# Patient Record
Sex: FEMALE | Race: WHITE | Employment: FULL TIME | URBAN - NONMETROPOLITAN AREA
[De-identification: names, ages, dates, MRNs, and addresses within clinical notes are randomized per-mention and may not be internally consistent; named-entity substitution may affect disease eponyms.]

---

## 2018-04-05 ENCOUNTER — OFFICE VISIT (OUTPATIENT)
Dept: URGENT CARE | Age: 23
End: 2018-04-05
Payer: COMMERCIAL

## 2018-04-05 VITALS
DIASTOLIC BLOOD PRESSURE: 75 MMHG | WEIGHT: 126 LBS | HEIGHT: 64 IN | BODY MASS INDEX: 21.51 KG/M2 | OXYGEN SATURATION: 98 % | SYSTOLIC BLOOD PRESSURE: 118 MMHG | RESPIRATION RATE: 20 BRPM | TEMPERATURE: 99 F | HEART RATE: 69 BPM

## 2018-04-05 DIAGNOSIS — R52 BODY ACHES: ICD-10-CM

## 2018-04-05 DIAGNOSIS — J06.9 VIRAL UPPER RESPIRATORY TRACT INFECTION WITH COUGH: Primary | ICD-10-CM

## 2018-04-05 DIAGNOSIS — J02.9 SORE THROAT: ICD-10-CM

## 2018-04-05 LAB
INFLUENZA A ANTIBODY: NORMAL
INFLUENZA B ANTIBODY: NORMAL
S PYO AG THROAT QL: NORMAL

## 2018-04-05 PROCEDURE — 87804 INFLUENZA ASSAY W/OPTIC: CPT | Performed by: NURSE PRACTITIONER

## 2018-04-05 PROCEDURE — 99202 OFFICE O/P NEW SF 15 MIN: CPT | Performed by: NURSE PRACTITIONER

## 2018-04-05 PROCEDURE — 87880 STREP A ASSAY W/OPTIC: CPT | Performed by: NURSE PRACTITIONER

## 2018-04-05 RX ORDER — ONDANSETRON 4 MG/1
4 TABLET, ORALLY DISINTEGRATING ORAL EVERY 8 HOURS PRN
Qty: 30 TABLET | Refills: 0 | Status: SHIPPED | OUTPATIENT
Start: 2018-04-05

## 2018-04-05 RX ORDER — BENZONATATE 100 MG/1
100 CAPSULE ORAL 3 TIMES DAILY PRN
Qty: 30 CAPSULE | Refills: 0 | Status: SHIPPED | OUTPATIENT
Start: 2018-04-05 | End: 2018-04-15

## 2018-04-05 ASSESSMENT — ENCOUNTER SYMPTOMS
CHEST TIGHTNESS: 0
COUGH: 1
RHINORRHEA: 0
VOMITING: 1
EYES NEGATIVE: 1
TROUBLE SWALLOWING: 1
SINUS PRESSURE: 0
SORE THROAT: 1
CONSTIPATION: 0
ABDOMINAL PAIN: 0
DIARRHEA: 0
WHEEZING: 0
NAUSEA: 1
ALLERGIC/IMMUNOLOGIC NEGATIVE: 1

## 2018-06-10 ENCOUNTER — HOSPITAL ENCOUNTER (EMERGENCY)
Age: 23
Discharge: HOME | End: 2018-06-10
Payer: COMMERCIAL

## 2018-06-10 VITALS
BODY MASS INDEX: 21.51 KG/M2 | OXYGEN SATURATION: 96 % | HEIGHT: 64 IN | SYSTOLIC BLOOD PRESSURE: 116 MMHG | WEIGHT: 126 LBS | TEMPERATURE: 98.4 F | HEART RATE: 68 BPM | RESPIRATION RATE: 16 BRPM | DIASTOLIC BLOOD PRESSURE: 71 MMHG

## 2018-06-10 DIAGNOSIS — T63.461A WASP STING, ACCIDENTAL OR UNINTENTIONAL, INITIAL ENCOUNTER: Primary | ICD-10-CM

## 2018-06-10 PROCEDURE — 99282 EMERGENCY DEPT VISIT SF MDM: CPT

## 2018-06-10 PROCEDURE — 99283 EMERGENCY DEPT VISIT LOW MDM: CPT | Performed by: NURSE PRACTITIONER

## 2018-06-10 RX ORDER — DIPHENHYDRAMINE HCL 25 MG
25 CAPSULE ORAL EVERY 6 HOURS PRN
Qty: 30 CAPSULE | Refills: 0 | Status: SHIPPED | OUTPATIENT
Start: 2018-06-10 | End: 2018-06-20

## 2018-06-10 RX ORDER — PREDNISONE 20 MG/1
20 TABLET ORAL 2 TIMES DAILY
Qty: 10 TABLET | Refills: 0 | Status: SHIPPED | OUTPATIENT
Start: 2018-06-10 | End: 2018-06-15

## 2018-06-10 ASSESSMENT — ENCOUNTER SYMPTOMS
SORE THROAT: 0
WHEEZING: 0
VOMITING: 0
NAUSEA: 0
THROAT SWELLING: 0

## 2018-09-02 ENCOUNTER — HOSPITAL ENCOUNTER (EMERGENCY)
Age: 23
Discharge: HOME | End: 2018-09-03
Payer: COMMERCIAL

## 2018-09-02 DIAGNOSIS — N83.201 RIGHT OVARIAN CYST: Primary | ICD-10-CM

## 2018-09-02 PROCEDURE — 99284 EMERGENCY DEPT VISIT MOD MDM: CPT

## 2018-09-02 ASSESSMENT — PAIN SCALES - GENERAL: PAINLEVEL_OUTOF10: 8

## 2018-09-02 ASSESSMENT — PAIN DESCRIPTION - LOCATION: LOCATION: ABDOMEN

## 2018-09-02 ASSESSMENT — PAIN DESCRIPTION - PAIN TYPE: TYPE: ACUTE PAIN

## 2018-09-03 ENCOUNTER — APPOINTMENT (OUTPATIENT)
Dept: CT IMAGING | Age: 23
End: 2018-09-03
Payer: COMMERCIAL

## 2018-09-03 VITALS
RESPIRATION RATE: 18 BRPM | DIASTOLIC BLOOD PRESSURE: 60 MMHG | OXYGEN SATURATION: 97 % | HEIGHT: 63 IN | SYSTOLIC BLOOD PRESSURE: 98 MMHG | HEART RATE: 63 BPM | BODY MASS INDEX: 22.68 KG/M2 | WEIGHT: 128 LBS | TEMPERATURE: 98.7 F

## 2018-09-03 LAB
ALBUMIN SERPL-MCNC: 4.9 G/DL (ref 3.5–5.2)
ALP BLD-CCNC: 71 U/L (ref 35–104)
ALT SERPL-CCNC: 13 U/L (ref 5–33)
ANION GAP SERPL CALCULATED.3IONS-SCNC: 13 MMOL/L (ref 7–19)
AST SERPL-CCNC: 15 U/L (ref 5–32)
BASOPHILS ABSOLUTE: 0.1 K/UL (ref 0–0.2)
BASOPHILS RELATIVE PERCENT: 0.9 % (ref 0–1)
BILIRUB SERPL-MCNC: 0.5 MG/DL (ref 0.2–1.2)
BILIRUBIN URINE: NEGATIVE
BLOOD, URINE: NEGATIVE
BUN BLDV-MCNC: 11 MG/DL (ref 6–20)
CALCIUM SERPL-MCNC: 10 MG/DL (ref 8.6–10)
CHLORIDE BLD-SCNC: 104 MMOL/L (ref 98–111)
CLARITY: CLEAR
CO2: 25 MMOL/L (ref 22–29)
COLOR: YELLOW
CREAT SERPL-MCNC: 0.7 MG/DL (ref 0.5–0.9)
EOSINOPHILS ABSOLUTE: 0.1 K/UL (ref 0–0.6)
EOSINOPHILS RELATIVE PERCENT: 1.2 % (ref 0–5)
GFR NON-AFRICAN AMERICAN: >60
GLUCOSE BLD-MCNC: 81 MG/DL (ref 74–109)
GLUCOSE URINE: NEGATIVE MG/DL
HCG(URINE) PREGNANCY TEST: NEGATIVE
HCT VFR BLD CALC: 45.8 % (ref 37–47)
HEMOGLOBIN: 14.7 G/DL (ref 12–16)
KETONES, URINE: NEGATIVE MG/DL
LEUKOCYTE ESTERASE, URINE: NEGATIVE
LIPASE: 31 U/L (ref 13–60)
LYMPHOCYTES ABSOLUTE: 3.8 K/UL (ref 1.1–4.5)
LYMPHOCYTES RELATIVE PERCENT: 34.1 % (ref 20–40)
MCH RBC QN AUTO: 28.7 PG (ref 27–31)
MCHC RBC AUTO-ENTMCNC: 32.1 G/DL (ref 33–37)
MCV RBC AUTO: 89.5 FL (ref 81–99)
MONOCYTES ABSOLUTE: 0.8 K/UL (ref 0–0.9)
MONOCYTES RELATIVE PERCENT: 7.1 % (ref 0–10)
NEUTROPHILS ABSOLUTE: 6.4 K/UL (ref 1.5–7.5)
NEUTROPHILS RELATIVE PERCENT: 56.3 % (ref 50–65)
NITRITE, URINE: NEGATIVE
PDW BLD-RTO: 13.4 % (ref 11.5–14.5)
PH UA: 5.5
PLATELET # BLD: 349 K/UL (ref 130–400)
PMV BLD AUTO: 9.7 FL (ref 9.4–12.3)
POTASSIUM SERPL-SCNC: 3.8 MMOL/L (ref 3.5–5)
PROTEIN UA: NEGATIVE MG/DL
RBC # BLD: 5.12 M/UL (ref 4.2–5.4)
SODIUM BLD-SCNC: 142 MMOL/L (ref 136–145)
SPECIFIC GRAVITY UA: 1.03
TOTAL PROTEIN: 8.2 G/DL (ref 6.6–8.7)
URINE REFLEX TO CULTURE: NORMAL
UROBILINOGEN, URINE: 1 E.U./DL
WBC # BLD: 11.3 K/UL (ref 4.8–10.8)

## 2018-09-03 PROCEDURE — 81003 URINALYSIS AUTO W/O SCOPE: CPT

## 2018-09-03 PROCEDURE — 74177 CT ABD & PELVIS W/CONTRAST: CPT

## 2018-09-03 PROCEDURE — 6360000002 HC RX W HCPCS: Performed by: NURSE PRACTITIONER

## 2018-09-03 PROCEDURE — 99284 EMERGENCY DEPT VISIT MOD MDM: CPT | Performed by: NURSE PRACTITIONER

## 2018-09-03 PROCEDURE — 2580000003 HC RX 258: Performed by: NURSE PRACTITIONER

## 2018-09-03 PROCEDURE — 6360000004 HC RX CONTRAST MEDICATION: Performed by: NURSE PRACTITIONER

## 2018-09-03 PROCEDURE — 6370000000 HC RX 637 (ALT 250 FOR IP): Performed by: NURSE PRACTITIONER

## 2018-09-03 PROCEDURE — 83690 ASSAY OF LIPASE: CPT

## 2018-09-03 PROCEDURE — 96374 THER/PROPH/DIAG INJ IV PUSH: CPT

## 2018-09-03 PROCEDURE — 85025 COMPLETE CBC W/AUTO DIFF WBC: CPT

## 2018-09-03 PROCEDURE — 80053 COMPREHEN METABOLIC PANEL: CPT

## 2018-09-03 PROCEDURE — 81025 URINE PREGNANCY TEST: CPT

## 2018-09-03 PROCEDURE — 96375 TX/PRO/DX INJ NEW DRUG ADDON: CPT

## 2018-09-03 PROCEDURE — 36415 COLL VENOUS BLD VENIPUNCTURE: CPT

## 2018-09-03 RX ORDER — OXYCODONE HYDROCHLORIDE AND ACETAMINOPHEN 5; 325 MG/1; MG/1
2 TABLET ORAL ONCE
Status: COMPLETED | OUTPATIENT
Start: 2018-09-03 | End: 2018-09-03

## 2018-09-03 RX ORDER — KETOROLAC TROMETHAMINE 30 MG/ML
30 INJECTION, SOLUTION INTRAMUSCULAR; INTRAVENOUS ONCE
Status: COMPLETED | OUTPATIENT
Start: 2018-09-03 | End: 2018-09-03

## 2018-09-03 RX ORDER — NAPROXEN 500 MG/1
500 TABLET ORAL 2 TIMES DAILY
Qty: 20 TABLET | Refills: 0 | Status: SHIPPED | OUTPATIENT
Start: 2018-09-03

## 2018-09-03 RX ORDER — 0.9 % SODIUM CHLORIDE 0.9 %
1000 INTRAVENOUS SOLUTION INTRAVENOUS ONCE
Status: COMPLETED | OUTPATIENT
Start: 2018-09-03 | End: 2018-09-03

## 2018-09-03 RX ORDER — ONDANSETRON 2 MG/ML
4 INJECTION INTRAMUSCULAR; INTRAVENOUS ONCE
Status: COMPLETED | OUTPATIENT
Start: 2018-09-03 | End: 2018-09-03

## 2018-09-03 RX ADMIN — IOPAMIDOL 75 ML: 755 INJECTION, SOLUTION INTRAVENOUS at 02:14

## 2018-09-03 RX ADMIN — OXYCODONE HYDROCHLORIDE AND ACETAMINOPHEN 2 TABLET: 5; 325 TABLET ORAL at 02:57

## 2018-09-03 RX ADMIN — KETOROLAC TROMETHAMINE 30 MG: 30 INJECTION, SOLUTION INTRAMUSCULAR; INTRAVENOUS at 01:05

## 2018-09-03 RX ADMIN — ONDANSETRON 4 MG: 2 INJECTION, SOLUTION INTRAMUSCULAR; INTRAVENOUS at 00:42

## 2018-09-03 RX ADMIN — SODIUM CHLORIDE 1000 ML: 9 INJECTION, SOLUTION INTRAVENOUS at 00:42

## 2018-09-03 ASSESSMENT — ENCOUNTER SYMPTOMS
SORE THROAT: 0
EYE DISCHARGE: 0
COUGH: 0
SHORTNESS OF BREATH: 0
ABDOMINAL PAIN: 1
VOMITING: 0
DIARRHEA: 0
NAUSEA: 0
WHEEZING: 0
BACK PAIN: 0

## 2018-09-03 ASSESSMENT — PAIN SCALES - GENERAL: PAINLEVEL_OUTOF10: 4

## 2018-09-03 NOTE — ED PROVIDER NOTES
[facility] EMERGENCY DEPT  eMERGENCY dEPARTMENT eNCOUnter      Pt Name: Ethan Johnson  MRN: [id number]  [place] 1995  Date of evaluation: 9/2/2018  Provider: GEORGE Jordan    CHIEF COMPLAINT       Chief Complaint   Patient presents with    Abdominal Pain     lower abd pain while walking started earlier today with nausea         HISTORY OF PRESENT ILLNESS  (Location/Symptom, Timing/Onset, Context/Setting, Quality, Duration, Modifying Factors, Severity.)   Ethan Johnson is a 20 y.o. female who presents to the emergency department With chief complaint of lower abdominal pain today. She describes this pain is localized to the pubic region and radiating up forward. She reports nausea however no vomiting. The history is provided by the patient. Nursing Notes were reviewed and I agree. REVIEW OF SYSTEMS    (2-9 systems for level 4, 10 or more for level 5)     Review of Systems   Constitutional: Negative for activity change, appetite change, chills and fever. HENT: Negative for congestion, ear pain and sore throat. Eyes: Negative for discharge. Respiratory: Negative for cough, shortness of breath and wheezing. Cardiovascular: Negative for chest pain and palpitations. Gastrointestinal: Positive for abdominal pain (Right lower quadrant pain ). Negative for diarrhea, nausea and vomiting. Genitourinary: Negative for difficulty urinating, dyspareunia, dysuria, frequency, hematuria and urgency. Musculoskeletal: Negative for back pain and neck pain. Skin: Negative for rash. Neurological: Negative for dizziness and headaches. Except as noted above the remainder of the review of systems was reviewed and negative. PAST MEDICAL HISTORY   History reviewed. No pertinent past medical history. SURGICAL HISTORY     History reviewed. No pertinent surgical history.       CURRENT MEDICATIONS       Previous Medications    ONDANSETRON (ZOFRAN ODT) 4 MG DISINTEGRATING TABLET    Take 1 tablet by mouth every 8 hours as needed for Nausea or Vomiting       ALLERGIES     Patient has no known allergies. FAMILY HISTORY     History reviewed. No pertinent family history. SOCIAL HISTORY       Social History     Social History    Marital status: Single     Spouse name: N/A    Number of children: N/A    Years of education: N/A     Social History Main Topics    Smoking status: Former Smoker    Smokeless tobacco: Never Used    Alcohol use Yes      Comment: OCCASIONAL    Drug use: No    Sexual activity: Yes     Partners: Male     Birth control/ protection: Condom     Other Topics Concern    None     Social History Narrative    None       SCREENINGS           PHYSICAL EXAM    (up to 7 for level 4, 8 or more for level 5)     ED Triage Vitals [09/02/18 2301]   BP Temp Temp Source Pulse Resp SpO2 Height Weight   131/76 98.4 °F (36.9 °C) Temporal 68 16 99 % 5' 3\" (1.6 m) 128 lb (58.1 kg)       Physical Exam   Constitutional: She is oriented to person, place, and time. She appears well-developed and well-nourished. No distress. HENT:   Head: Normocephalic. Mouth/Throat: No oropharyngeal exudate. Eyes: Pupils are equal, round, and reactive to light. EOM are normal. Right eye exhibits no discharge. Left eye exhibits no discharge. No scleral icterus. Neck: Normal range of motion. No tracheal deviation present. Cardiovascular: Normal rate, regular rhythm and normal heart sounds. No murmur heard. Pulmonary/Chest: Effort normal and breath sounds normal. No stridor. No respiratory distress. She has no wheezes. Abdominal: Soft. Bowel sounds are normal. She exhibits no distension. There is tenderness (Lower right quadrant). Musculoskeletal: Normal range of motion. Lymphadenopathy:     She has no cervical adenopathy. Neurological: She is alert and oriented to person, place, and time. Skin: Skin is warm and dry. No rash noted. She is not diaphoretic. No erythema. No pallor. Psychiatric: She has a normal mood and affect. Her behavior is normal. Judgment and thought content normal.   Nursing note and vitals reviewed. DIAGNOSTIC RESULTS     RADIOLOGY:   Non-plain film images such as CT, Ultrasound and MRI are read by the radiologist. Plain radiographic images are visualized and preliminarily interpreted by No att. providers found with the below findings:        Interpretation per the Radiologist below, if available at the time of this note:    CT ABDOMEN PELVIS W IV CONTRAST Additional Contrast? None    (Results Pending)     Preliminary report shows a 2.2 cm right adnexal cyst with mild free fluid in the pelvis suggesting recent cyst rupture. Normal appendix. LABS:  Labs Reviewed   CBC WITH AUTO DIFFERENTIAL - Abnormal; Notable for the following:        Result Value    WBC 11.3 (*)     MCHC 32.1 (*)     All other components within normal limits   COMPREHENSIVE METABOLIC PANEL   URINE RT REFLEX TO CULTURE   PREGNANCY, URINE   LIPASE       All other labs were within normal range or not returned as of this dictation. RE-ASSESSMENT          EMERGENCY DEPARTMENT COURSE and DIFFERENTIAL DIAGNOSIS/MDM:   Vitals:    Vitals:    09/02/18 2301 09/03/18 0236   BP: 131/76 (!) 91/54   Pulse: 68 60   Resp: 16 16   Temp: 98.4 °F (36.9 °C) 98.5 °F (36.9 °C)   TempSrc: Temporal Oral   SpO2: 99% 98%   Weight: 128 lb (58.1 kg)    Height: 5' 3\" (1.6 m)            MDM  Number of Diagnoses or Management Options  Right ovarian cyst:   Diagnosis management comments: Patient presents to the ED with right lower quadrant sharp abdominal pain that began abruptly tonight. Her labs are unremarkable however she still  Complains of right lower tenderness therefore I felt we should do a CT.  Her CT shows a recent cyst rupture with an additional 2.2 cm right adnexal cyst. I discussed the findings with the patient and at this time we will give her information for follow-up so she can establish care with

## 2020-03-31 LAB
EXTERNAL ANTIBODY SCREEN: NEGATIVE
EXTERNAL HEPATITIS B SURFACE ANTIGEN: NEGATIVE
EXTERNAL HEPATITIS C AB: NEGATIVE
EXTERNAL HERPES CULTURE: NORMAL
EXTERNAL RUBELLA QUALITATIVE: NORMAL
EXTERNAL SYPHILIS RPR SCREEN: NORMAL

## 2020-08-20 LAB
EXTERNAL CHLAMYDIA SCREEN: NEGATIVE
EXTERNAL GONORRHEA SCREEN: NEGATIVE
HIV1 P24 AG SERPL QL IA: NEGATIVE

## 2020-10-26 LAB — EXTERNAL GROUP B STREP ANTIGEN: NEGATIVE

## 2020-11-23 ENCOUNTER — HOSPITAL ENCOUNTER (INPATIENT)
Facility: HOSPITAL | Age: 25
LOS: 2 days | Discharge: HOME | End: 2020-11-25
Attending: OBSTETRICS & GYNECOLOGY | Admitting: OBSTETRICS & GYNECOLOGY

## 2020-11-23 DIAGNOSIS — Z37.9 NORMAL LABOR: Primary | ICD-10-CM

## 2020-11-23 LAB
ABO GROUP BLD: NORMAL
BLD GP AB SCN SERPL QL: NEGATIVE
DEPRECATED RDW RBC AUTO: 43.9 FL (ref 37–54)
ERYTHROCYTE [DISTWIDTH] IN BLOOD BY AUTOMATED COUNT: 15.3 % (ref 12.3–15.4)
HCT VFR BLD AUTO: 31 % (ref 34–46.6)
HGB BLD-MCNC: 9.8 G/DL (ref 12–15.9)
LYMPHOCYTES # BLD MANUAL: 2.1 10*3/MM3 (ref 0.7–3.1)
LYMPHOCYTES NFR BLD MANUAL: 19 % (ref 19.6–45.3)
LYMPHOCYTES NFR BLD MANUAL: 5 % (ref 5–12)
MCH RBC QN AUTO: 25.3 PG (ref 26.6–33)
MCHC RBC AUTO-ENTMCNC: 31.6 G/DL (ref 31.5–35.7)
MCV RBC AUTO: 79.9 FL (ref 79–97)
METAMYELOCYTES NFR BLD MANUAL: 1 % (ref 0–0)
MONOCYTES # BLD AUTO: 0.55 10*3/MM3 (ref 0.1–0.9)
NEUTROPHILS # BLD AUTO: 8.28 10*3/MM3 (ref 1.7–7)
NEUTROPHILS NFR BLD MANUAL: 75 % (ref 42.7–76)
PLAT MORPH BLD: NORMAL
PLATELET # BLD AUTO: 282 10*3/MM3 (ref 140–450)
PMV BLD AUTO: 10.8 FL (ref 6–12)
RBC # BLD AUTO: 3.88 10*6/MM3 (ref 3.77–5.28)
RBC MORPH BLD: NORMAL
RH BLD: POSITIVE
T&S EXPIRATION DATE: NORMAL
WBC # BLD AUTO: 11.04 10*3/MM3 (ref 3.4–10.8)
WBC MORPH BLD: NORMAL

## 2020-11-23 PROCEDURE — 85007 BL SMEAR W/DIFF WBC COUNT: CPT | Performed by: OBSTETRICS & GYNECOLOGY

## 2020-11-23 PROCEDURE — 88307 TISSUE EXAM BY PATHOLOGIST: CPT | Performed by: OBSTETRICS & GYNECOLOGY

## 2020-11-23 PROCEDURE — 86900 BLOOD TYPING SEROLOGIC ABO: CPT | Performed by: OBSTETRICS & GYNECOLOGY

## 2020-11-23 PROCEDURE — 25010000002 BUTORPHANOL PER 1 MG: Performed by: OBSTETRICS & GYNECOLOGY

## 2020-11-23 PROCEDURE — 86901 BLOOD TYPING SEROLOGIC RH(D): CPT | Performed by: OBSTETRICS & GYNECOLOGY

## 2020-11-23 PROCEDURE — 86850 RBC ANTIBODY SCREEN: CPT | Performed by: OBSTETRICS & GYNECOLOGY

## 2020-11-23 PROCEDURE — P9612 CATHETERIZE FOR URINE SPEC: HCPCS

## 2020-11-23 PROCEDURE — 85027 COMPLETE CBC AUTOMATED: CPT | Performed by: OBSTETRICS & GYNECOLOGY

## 2020-11-23 PROCEDURE — 25010000003 LIDOCAINE 1 % SOLUTION

## 2020-11-23 RX ORDER — ONDANSETRON 2 MG/ML
4 INJECTION INTRAMUSCULAR; INTRAVENOUS EVERY 6 HOURS PRN
Status: DISCONTINUED | OUTPATIENT
Start: 2020-11-23 | End: 2020-11-23 | Stop reason: SDUPTHER

## 2020-11-23 RX ORDER — OXYCODONE HYDROCHLORIDE AND ACETAMINOPHEN 5; 325 MG/1; MG/1
1 TABLET ORAL EVERY 6 HOURS PRN
Status: DISCONTINUED | OUTPATIENT
Start: 2020-11-23 | End: 2020-11-25 | Stop reason: HOSPADM

## 2020-11-23 RX ORDER — ONDANSETRON 2 MG/ML
4 INJECTION INTRAMUSCULAR; INTRAVENOUS EVERY 6 HOURS PRN
Status: DISCONTINUED | OUTPATIENT
Start: 2020-11-23 | End: 2020-11-23 | Stop reason: HOSPADM

## 2020-11-23 RX ORDER — LIDOCAINE HYDROCHLORIDE 10 MG/ML
5 INJECTION, SOLUTION EPIDURAL; INFILTRATION; INTRACAUDAL; PERINEURAL AS NEEDED
Status: DISCONTINUED | OUTPATIENT
Start: 2020-11-23 | End: 2020-11-23 | Stop reason: HOSPADM

## 2020-11-23 RX ORDER — SODIUM CHLORIDE, SODIUM LACTATE, POTASSIUM CHLORIDE, CALCIUM CHLORIDE 600; 310; 30; 20 MG/100ML; MG/100ML; MG/100ML; MG/100ML
125 INJECTION, SOLUTION INTRAVENOUS CONTINUOUS
Status: DISCONTINUED | OUTPATIENT
Start: 2020-11-23 | End: 2020-11-25 | Stop reason: HOSPADM

## 2020-11-23 RX ORDER — OXYTOCIN/0.9 % SODIUM CHLORIDE 30/500 ML
250 PLASTIC BAG, INJECTION (ML) INTRAVENOUS CONTINUOUS
Status: DISPENSED | OUTPATIENT
Start: 2020-11-23 | End: 2020-11-23

## 2020-11-23 RX ORDER — ONDANSETRON 2 MG/ML
4 INJECTION INTRAMUSCULAR; INTRAVENOUS EVERY 6 HOURS PRN
Status: DISCONTINUED | OUTPATIENT
Start: 2020-11-23 | End: 2020-11-25 | Stop reason: HOSPADM

## 2020-11-23 RX ORDER — HYDROCORTISONE 25 MG/G
1 CREAM TOPICAL AS NEEDED
Status: DISCONTINUED | OUTPATIENT
Start: 2020-11-23 | End: 2020-11-25 | Stop reason: HOSPADM

## 2020-11-23 RX ORDER — VALACYCLOVIR HYDROCHLORIDE 500 MG/1
500 TABLET, FILM COATED ORAL DAILY
Status: ON HOLD | COMMUNITY
End: 2020-11-23

## 2020-11-23 RX ORDER — CARBOPROST TROMETHAMINE 250 UG/ML
250 INJECTION, SOLUTION INTRAMUSCULAR ONCE AS NEEDED
Status: DISCONTINUED | OUTPATIENT
Start: 2020-11-23 | End: 2020-11-25 | Stop reason: HOSPADM

## 2020-11-23 RX ORDER — OXYTOCIN/0.9 % SODIUM CHLORIDE 30/500 ML
999 PLASTIC BAG, INJECTION (ML) INTRAVENOUS ONCE
Status: DISCONTINUED | OUTPATIENT
Start: 2020-11-23 | End: 2020-11-23 | Stop reason: HOSPADM

## 2020-11-23 RX ORDER — CARBOPROST TROMETHAMINE 250 UG/ML
250 INJECTION, SOLUTION INTRAMUSCULAR AS NEEDED
Status: DISCONTINUED | OUTPATIENT
Start: 2020-11-23 | End: 2020-11-23 | Stop reason: HOSPADM

## 2020-11-23 RX ORDER — ACETAMINOPHEN 325 MG/1
650 TABLET ORAL EVERY 4 HOURS PRN
Status: DISCONTINUED | OUTPATIENT
Start: 2020-11-23 | End: 2020-11-23 | Stop reason: HOSPADM

## 2020-11-23 RX ORDER — METHYLERGONOVINE MALEATE 0.2 MG/ML
200 INJECTION INTRAVENOUS ONCE AS NEEDED
Status: DISCONTINUED | OUTPATIENT
Start: 2020-11-23 | End: 2020-11-25 | Stop reason: HOSPADM

## 2020-11-23 RX ORDER — BISACODYL 10 MG
10 SUPPOSITORY, RECTAL RECTAL DAILY PRN
Status: DISCONTINUED | OUTPATIENT
Start: 2020-11-24 | End: 2020-11-25 | Stop reason: HOSPADM

## 2020-11-23 RX ORDER — LIDOCAINE HYDROCHLORIDE 10 MG/ML
20 INJECTION, SOLUTION EPIDURAL; INFILTRATION; INTRACAUDAL; PERINEURAL ONCE
Status: DISCONTINUED | OUTPATIENT
Start: 2020-11-23 | End: 2020-11-23

## 2020-11-23 RX ORDER — BUTORPHANOL TARTRATE 1 MG/ML
1 INJECTION, SOLUTION INTRAMUSCULAR; INTRAVENOUS
Status: DISCONTINUED | OUTPATIENT
Start: 2020-11-23 | End: 2020-11-23 | Stop reason: HOSPADM

## 2020-11-23 RX ORDER — MISOPROSTOL 200 UG/1
800 TABLET ORAL AS NEEDED
Status: DISCONTINUED | OUTPATIENT
Start: 2020-11-23 | End: 2020-11-23 | Stop reason: HOSPADM

## 2020-11-23 RX ORDER — OXYTOCIN/0.9 % SODIUM CHLORIDE 30/500 ML
125 PLASTIC BAG, INJECTION (ML) INTRAVENOUS CONTINUOUS PRN
Status: DISCONTINUED | OUTPATIENT
Start: 2020-11-23 | End: 2020-11-25 | Stop reason: HOSPADM

## 2020-11-23 RX ORDER — OXYTOCIN/0.9 % SODIUM CHLORIDE 30/500 ML
999 PLASTIC BAG, INJECTION (ML) INTRAVENOUS ONCE
Status: DISCONTINUED | OUTPATIENT
Start: 2020-11-23 | End: 2020-11-25 | Stop reason: HOSPADM

## 2020-11-23 RX ORDER — ONDANSETRON 4 MG/1
4 TABLET, FILM COATED ORAL EVERY 6 HOURS PRN
Status: DISCONTINUED | OUTPATIENT
Start: 2020-11-23 | End: 2020-11-25 | Stop reason: HOSPADM

## 2020-11-23 RX ORDER — OXYTOCIN/0.9 % SODIUM CHLORIDE 30/500 ML
250 PLASTIC BAG, INJECTION (ML) INTRAVENOUS CONTINUOUS
Status: ACTIVE | OUTPATIENT
Start: 2020-11-23 | End: 2020-11-23

## 2020-11-23 RX ORDER — PROMETHAZINE HYDROCHLORIDE 12.5 MG/1
12.5 SUPPOSITORY RECTAL EVERY 6 HOURS PRN
Status: DISCONTINUED | OUTPATIENT
Start: 2020-11-23 | End: 2020-11-25 | Stop reason: HOSPADM

## 2020-11-23 RX ORDER — TERBUTALINE SULFATE 1 MG/ML
0.25 INJECTION, SOLUTION SUBCUTANEOUS AS NEEDED
Status: DISCONTINUED | OUTPATIENT
Start: 2020-11-23 | End: 2020-11-23 | Stop reason: HOSPADM

## 2020-11-23 RX ORDER — PRENATAL VIT/IRON FUM/FOLIC AC 27MG-0.8MG
1 TABLET ORAL DAILY
Status: DISCONTINUED | OUTPATIENT
Start: 2020-11-23 | End: 2020-11-25 | Stop reason: HOSPADM

## 2020-11-23 RX ORDER — MISOPROSTOL 200 UG/1
600 TABLET ORAL ONCE AS NEEDED
Status: DISCONTINUED | OUTPATIENT
Start: 2020-11-23 | End: 2020-11-25 | Stop reason: HOSPADM

## 2020-11-23 RX ORDER — PRENATAL VIT NO.126/IRON/FOLIC 28MG-0.8MG
1 TABLET ORAL DAILY
COMMUNITY

## 2020-11-23 RX ORDER — ONDANSETRON 4 MG/1
4 TABLET, FILM COATED ORAL EVERY 6 HOURS PRN
Status: DISCONTINUED | OUTPATIENT
Start: 2020-11-23 | End: 2020-11-23 | Stop reason: HOSPADM

## 2020-11-23 RX ORDER — SODIUM CHLORIDE, SODIUM LACTATE, POTASSIUM CHLORIDE, CALCIUM CHLORIDE 600; 310; 30; 20 MG/100ML; MG/100ML; MG/100ML; MG/100ML
125 INJECTION, SOLUTION INTRAVENOUS CONTINUOUS
Status: DISCONTINUED | OUTPATIENT
Start: 2020-11-23 | End: 2020-11-23

## 2020-11-23 RX ORDER — METHYLERGONOVINE MALEATE 0.2 MG/ML
200 INJECTION INTRAVENOUS ONCE AS NEEDED
Status: DISCONTINUED | OUTPATIENT
Start: 2020-11-23 | End: 2020-11-23 | Stop reason: HOSPADM

## 2020-11-23 RX ORDER — OXYTOCIN/0.9 % SODIUM CHLORIDE 30/500 ML
2-30 PLASTIC BAG, INJECTION (ML) INTRAVENOUS
Status: DISCONTINUED | OUTPATIENT
Start: 2020-11-23 | End: 2020-11-23 | Stop reason: HOSPADM

## 2020-11-23 RX ORDER — PROMETHAZINE HYDROCHLORIDE 25 MG/1
25 TABLET ORAL EVERY 6 HOURS PRN
Status: DISCONTINUED | OUTPATIENT
Start: 2020-11-23 | End: 2020-11-25 | Stop reason: HOSPADM

## 2020-11-23 RX ORDER — LIDOCAINE HYDROCHLORIDE 10 MG/ML
INJECTION, SOLUTION INFILTRATION; PERINEURAL
Status: COMPLETED
Start: 2020-11-23 | End: 2020-11-23

## 2020-11-23 RX ORDER — OXYTOCIN/0.9 % SODIUM CHLORIDE 30/500 ML
125 PLASTIC BAG, INJECTION (ML) INTRAVENOUS CONTINUOUS PRN
Status: DISCONTINUED | OUTPATIENT
Start: 2020-11-23 | End: 2020-11-23 | Stop reason: HOSPADM

## 2020-11-23 RX ORDER — SODIUM CHLORIDE 0.9 % (FLUSH) 0.9 %
1-10 SYRINGE (ML) INJECTION AS NEEDED
Status: DISCONTINUED | OUTPATIENT
Start: 2020-11-23 | End: 2020-11-25 | Stop reason: HOSPADM

## 2020-11-23 RX ORDER — SODIUM CHLORIDE 0.9 % (FLUSH) 0.9 %
10 SYRINGE (ML) INJECTION AS NEEDED
Status: DISCONTINUED | OUTPATIENT
Start: 2020-11-23 | End: 2020-11-23 | Stop reason: HOSPADM

## 2020-11-23 RX ORDER — SODIUM CHLORIDE 0.9 % (FLUSH) 0.9 %
3 SYRINGE (ML) INJECTION EVERY 12 HOURS SCHEDULED
Status: DISCONTINUED | OUTPATIENT
Start: 2020-11-23 | End: 2020-11-23 | Stop reason: HOSPADM

## 2020-11-23 RX ORDER — IBUPROFEN 800 MG/1
800 TABLET ORAL EVERY 8 HOURS PRN
Status: DISCONTINUED | OUTPATIENT
Start: 2020-11-23 | End: 2020-11-25 | Stop reason: HOSPADM

## 2020-11-23 RX ORDER — DOCUSATE SODIUM 100 MG/1
100 CAPSULE, LIQUID FILLED ORAL 2 TIMES DAILY
Status: DISCONTINUED | OUTPATIENT
Start: 2020-11-23 | End: 2020-11-25 | Stop reason: HOSPADM

## 2020-11-23 RX ADMIN — OXYTOCIN-SODIUM CHLORIDE 0.9% IV SOLN 30 UNIT/500ML 2 MILLI-UNITS/MIN: 30-0.9/5 SOLUTION at 06:54

## 2020-11-23 RX ADMIN — SODIUM CHLORIDE, POTASSIUM CHLORIDE, SODIUM LACTATE AND CALCIUM CHLORIDE 125 ML/HR: 600; 310; 30; 20 INJECTION, SOLUTION INTRAVENOUS at 06:32

## 2020-11-23 RX ADMIN — IBUPROFEN 800 MG: 800 TABLET, FILM COATED ORAL at 21:40

## 2020-11-23 RX ADMIN — BUTORPHANOL TARTRATE 1 MG: 1 INJECTION, SOLUTION INTRAMUSCULAR; INTRAVENOUS at 08:59

## 2020-11-23 RX ADMIN — DOCUSATE SODIUM 100 MG: 100 CAPSULE ORAL at 20:00

## 2020-11-23 RX ADMIN — SODIUM CHLORIDE, PRESERVATIVE FREE 10 ML: 5 INJECTION INTRAVENOUS at 13:18

## 2020-11-23 RX ADMIN — SODIUM CHLORIDE, PRESERVATIVE FREE 10 ML: 5 INJECTION INTRAVENOUS at 19:58

## 2020-11-23 RX ADMIN — OXYCODONE HYDROCHLORIDE AND ACETAMINOPHEN 1 TABLET: 5; 325 TABLET ORAL at 14:36

## 2020-11-23 RX ADMIN — PRENATAL VIT W/ FE FUMARATE-FA TAB 27-0.8 MG 1 TABLET: 27-0.8 TAB at 13:17

## 2020-11-23 RX ADMIN — LIDOCAINE HYDROCHLORIDE 20 ML: 10 INJECTION, SOLUTION INFILTRATION; PERINEURAL at 08:50

## 2020-11-23 RX ADMIN — IBUPROFEN 800 MG: 800 TABLET, FILM COATED ORAL at 11:10

## 2020-11-23 RX ADMIN — DOCUSATE SODIUM 100 MG: 100 CAPSULE ORAL at 13:17

## 2020-11-23 RX ADMIN — OXYCODONE HYDROCHLORIDE AND ACETAMINOPHEN 1 TABLET: 5; 325 TABLET ORAL at 21:40

## 2020-11-23 RX ADMIN — OXYTOCIN-SODIUM CHLORIDE 0.9% IV SOLN 30 UNIT/500ML 250 ML/HR: 30-0.9/5 SOLUTION at 09:15

## 2020-11-23 RX ADMIN — BUTORPHANOL TARTRATE 1 MG: 1 INJECTION, SOLUTION INTRAMUSCULAR; INTRAVENOUS at 06:48

## 2020-11-24 LAB
HCT VFR BLD AUTO: 28.4 % (ref 34–46.6)
HGB BLD-MCNC: 9 G/DL (ref 12–15.9)

## 2020-11-24 PROCEDURE — 85018 HEMOGLOBIN: CPT | Performed by: OBSTETRICS & GYNECOLOGY

## 2020-11-24 PROCEDURE — 90471 IMMUNIZATION ADMIN: CPT | Performed by: OBSTETRICS & GYNECOLOGY

## 2020-11-24 PROCEDURE — 85014 HEMATOCRIT: CPT | Performed by: OBSTETRICS & GYNECOLOGY

## 2020-11-24 PROCEDURE — 90715 TDAP VACCINE 7 YRS/> IM: CPT | Performed by: OBSTETRICS & GYNECOLOGY

## 2020-11-24 PROCEDURE — 25010000002 TDAP 5-2.5-18.5 LF-MCG/0.5 SUSPENSION: Performed by: OBSTETRICS & GYNECOLOGY

## 2020-11-24 RX ADMIN — IBUPROFEN 800 MG: 800 TABLET, FILM COATED ORAL at 09:19

## 2020-11-24 RX ADMIN — IBUPROFEN 800 MG: 800 TABLET, FILM COATED ORAL at 22:03

## 2020-11-24 RX ADMIN — DOCUSATE SODIUM 100 MG: 100 CAPSULE ORAL at 22:03

## 2020-11-24 RX ADMIN — PRENATAL VIT W/ FE FUMARATE-FA TAB 27-0.8 MG 1 TABLET: 27-0.8 TAB at 09:19

## 2020-11-24 RX ADMIN — MAGNESIUM HYDROXIDE 10 ML: 2400 SUSPENSION ORAL at 14:53

## 2020-11-24 RX ADMIN — DOCUSATE SODIUM 100 MG: 100 CAPSULE ORAL at 09:19

## 2020-11-24 RX ADMIN — OXYCODONE HYDROCHLORIDE AND ACETAMINOPHEN 1 TABLET: 5; 325 TABLET ORAL at 22:03

## 2020-11-24 RX ADMIN — TETANUS TOXOID, REDUCED DIPHTHERIA TOXOID AND ACELLULAR PERTUSSIS VACCINE, ADSORBED 0.5 ML: 5; 2.5; 8; 8; 2.5 SUSPENSION INTRAMUSCULAR at 09:21

## 2020-11-25 VITALS
RESPIRATION RATE: 18 BRPM | SYSTOLIC BLOOD PRESSURE: 123 MMHG | HEIGHT: 63 IN | WEIGHT: 188 LBS | BODY MASS INDEX: 33.31 KG/M2 | DIASTOLIC BLOOD PRESSURE: 69 MMHG | OXYGEN SATURATION: 97 % | TEMPERATURE: 98.1 F | HEART RATE: 75 BPM

## 2020-11-25 PROCEDURE — G0008 ADMIN INFLUENZA VIRUS VAC: HCPCS | Performed by: OBSTETRICS & GYNECOLOGY

## 2020-11-25 PROCEDURE — 25010000002 INFLUENZA VAC SPLIT QUAD 0.5 ML SUSPENSION PREFILLED SYRINGE: Performed by: OBSTETRICS & GYNECOLOGY

## 2020-11-25 PROCEDURE — 90686 IIV4 VACC NO PRSV 0.5 ML IM: CPT | Performed by: OBSTETRICS & GYNECOLOGY

## 2020-11-25 RX ORDER — IBUPROFEN 800 MG/1
800 TABLET ORAL EVERY 8 HOURS PRN
Qty: 90 TABLET | Refills: 2 | Status: SHIPPED | OUTPATIENT
Start: 2020-11-25 | End: 2022-05-10

## 2020-11-25 RX ORDER — OXYCODONE HYDROCHLORIDE AND ACETAMINOPHEN 5; 325 MG/1; MG/1
1 TABLET ORAL EVERY 6 HOURS PRN
Qty: 10 TABLET | Refills: 0 | Status: SHIPPED | OUTPATIENT
Start: 2020-11-25 | End: 2020-12-03

## 2020-11-25 RX ADMIN — OXYCODONE HYDROCHLORIDE AND ACETAMINOPHEN 1 TABLET: 5; 325 TABLET ORAL at 05:30

## 2020-11-25 RX ADMIN — INFLUENZA VIRUS VACCINE 0.5 ML: 15; 15; 15; 15 SUSPENSION INTRAMUSCULAR at 09:25

## 2020-11-25 RX ADMIN — IBUPROFEN 800 MG: 800 TABLET, FILM COATED ORAL at 05:30

## 2020-11-25 RX ADMIN — PRENATAL VIT W/ FE FUMARATE-FA TAB 27-0.8 MG 1 TABLET: 27-0.8 TAB at 08:24

## 2020-11-27 ENCOUNTER — HOSPITAL ENCOUNTER (OUTPATIENT)
Dept: LACTATION | Facility: HOSPITAL | Age: 25
Discharge: HOME | End: 2020-11-27

## 2020-11-30 LAB
LAB AP CASE REPORT: NORMAL
LAB AP CLINICAL INFORMATION: NORMAL
PATH REPORT.FINAL DX SPEC: NORMAL
PATH REPORT.GROSS SPEC: NORMAL

## 2022-05-10 ENCOUNTER — OFFICE VISIT (OUTPATIENT)
Dept: FAMILY MEDICINE CLINIC | Facility: CLINIC | Age: 27
End: 2022-05-10

## 2022-05-10 ENCOUNTER — LAB (OUTPATIENT)
Dept: LAB | Facility: HOSPITAL | Age: 27
End: 2022-05-10

## 2022-05-10 VITALS
TEMPERATURE: 97.1 F | DIASTOLIC BLOOD PRESSURE: 80 MMHG | OXYGEN SATURATION: 97 % | HEIGHT: 63 IN | BODY MASS INDEX: 31.18 KG/M2 | SYSTOLIC BLOOD PRESSURE: 120 MMHG | WEIGHT: 176 LBS | HEART RATE: 91 BPM | RESPIRATION RATE: 20 BRPM

## 2022-05-10 DIAGNOSIS — E66.09 CLASS 1 OBESITY DUE TO EXCESS CALORIES WITHOUT SERIOUS COMORBIDITY WITH BODY MASS INDEX (BMI) OF 31.0 TO 31.9 IN ADULT: ICD-10-CM

## 2022-05-10 DIAGNOSIS — Z00.00 WELLNESS EXAMINATION: Primary | ICD-10-CM

## 2022-05-10 DIAGNOSIS — Z00.00 WELLNESS EXAMINATION: ICD-10-CM

## 2022-05-10 DIAGNOSIS — F31.9 BIPOLAR 1 DISORDER: ICD-10-CM

## 2022-05-10 PROBLEM — O28.3 ABNORMAL FETAL ULTRASOUND: Status: ACTIVE | Noted: 2020-08-19

## 2022-05-10 LAB
ALBUMIN SERPL-MCNC: 4.7 G/DL (ref 3.5–5)
ALBUMIN/GLOB SERPL: 1.2 G/DL (ref 1.1–2.5)
ALP SERPL-CCNC: 88 U/L (ref 24–120)
ALT SERPL W P-5'-P-CCNC: 26 U/L (ref 0–35)
ANION GAP SERPL CALCULATED.3IONS-SCNC: 5 MMOL/L (ref 4–13)
AST SERPL-CCNC: 31 U/L (ref 7–45)
AUTO MIXED CELLS #: 0.7 10*3/MM3 (ref 0.1–2.6)
AUTO MIXED CELLS %: 6.9 % (ref 0.1–24)
BACTERIA UR QL AUTO: ABNORMAL /HPF
BILIRUB SERPL-MCNC: 0.4 MG/DL (ref 0.1–1)
BILIRUB UR QL STRIP: NEGATIVE
BUN SERPL-MCNC: 15 MG/DL (ref 5–21)
BUN/CREAT SERPL: 20.8
CALCIUM SPEC-SCNC: 9.5 MG/DL (ref 8.4–10.4)
CHLORIDE SERPL-SCNC: 108 MMOL/L (ref 98–110)
CHOLEST SERPL-MCNC: 241 MG/DL (ref 130–200)
CLARITY UR: CLEAR
CO2 SERPL-SCNC: 25 MMOL/L (ref 24–31)
COLOR UR: YELLOW
CREAT SERPL-MCNC: 0.72 MG/DL (ref 0.5–1.4)
EGFRCR SERPLBLD CKD-EPI 2021: 116.2 ML/MIN/1.73
ERYTHROCYTE [DISTWIDTH] IN BLOOD BY AUTOMATED COUNT: 14 % (ref 12.3–15.4)
GLOBULIN UR ELPH-MCNC: 3.9 GM/DL
GLUCOSE SERPL-MCNC: 99 MG/DL (ref 70–100)
GLUCOSE UR STRIP-MCNC: NEGATIVE MG/DL
HBA1C MFR BLD: 5.1 % (ref 4.8–5.9)
HCT VFR BLD AUTO: 41.1 % (ref 34–46.6)
HDLC SERPL-MCNC: 34 MG/DL
HGB BLD-MCNC: 13.2 G/DL (ref 12–15.9)
HGB UR QL STRIP.AUTO: ABNORMAL
HYALINE CASTS UR QL AUTO: ABNORMAL /LPF
KETONES UR QL STRIP: NEGATIVE
LDLC SERPL CALC-MCNC: 184 MG/DL (ref 0–99)
LDLC/HDLC SERPL: 5.35 {RATIO}
LEUKOCYTE ESTERASE UR QL STRIP.AUTO: ABNORMAL
LYMPHOCYTES # BLD AUTO: 2.8 10*3/MM3 (ref 0.7–3.1)
LYMPHOCYTES NFR BLD AUTO: 27.5 % (ref 19.6–45.3)
MCH RBC QN AUTO: 27.5 PG (ref 26.6–33)
MCHC RBC AUTO-ENTMCNC: 32.1 G/DL (ref 31.5–35.7)
MCV RBC AUTO: 85.6 FL (ref 79–97)
NEUTROPHILS NFR BLD AUTO: 6.8 10*3/MM3 (ref 1.7–7)
NEUTROPHILS NFR BLD AUTO: 65.6 % (ref 42.7–76)
NITRITE UR QL STRIP: NEGATIVE
PH UR STRIP.AUTO: 7.5 [PH] (ref 5–8)
PLATELET # BLD AUTO: 371 10*3/MM3 (ref 140–450)
PMV BLD AUTO: 9.6 FL (ref 6–12)
POTASSIUM SERPL-SCNC: 4.4 MMOL/L (ref 3.5–5.3)
PROT SERPL-MCNC: 8.6 G/DL (ref 6.3–8.7)
PROT UR QL STRIP: ABNORMAL
RBC # BLD AUTO: 4.8 10*6/MM3 (ref 3.77–5.28)
RBC # UR STRIP: ABNORMAL /HPF
REF LAB TEST METHOD: ABNORMAL
SODIUM SERPL-SCNC: 138 MMOL/L (ref 135–145)
SP GR UR STRIP: 1.02 (ref 1–1.03)
SQUAMOUS #/AREA URNS HPF: ABNORMAL /HPF
TRIGL SERPL-MCNC: 125 MG/DL (ref 0–149)
UROBILINOGEN UR QL STRIP: ABNORMAL
VLDLC SERPL-MCNC: 23 MG/DL (ref 5–40)
WBC # UR STRIP: ABNORMAL /HPF
WBC NRBC COR # BLD: 10.3 10*3/MM3 (ref 3.4–10.8)

## 2022-05-10 PROCEDURE — 80053 COMPREHEN METABOLIC PANEL: CPT

## 2022-05-10 PROCEDURE — 3008F BODY MASS INDEX DOCD: CPT | Performed by: NURSE PRACTITIONER

## 2022-05-10 PROCEDURE — 36415 COLL VENOUS BLD VENIPUNCTURE: CPT

## 2022-05-10 PROCEDURE — 81001 URINALYSIS AUTO W/SCOPE: CPT

## 2022-05-10 PROCEDURE — 99385 PREV VISIT NEW AGE 18-39: CPT | Performed by: NURSE PRACTITIONER

## 2022-05-10 PROCEDURE — 85025 COMPLETE CBC W/AUTO DIFF WBC: CPT

## 2022-05-10 PROCEDURE — 83036 HEMOGLOBIN GLYCOSYLATED A1C: CPT

## 2022-05-10 PROCEDURE — 2014F MENTAL STATUS ASSESS: CPT | Performed by: NURSE PRACTITIONER

## 2022-05-10 PROCEDURE — 84443 ASSAY THYROID STIM HORMONE: CPT

## 2022-05-10 PROCEDURE — 87086 URINE CULTURE/COLONY COUNT: CPT

## 2022-05-10 PROCEDURE — 80061 LIPID PANEL: CPT

## 2022-05-10 RX ORDER — LAMOTRIGINE 25 MG/1
TABLET ORAL
Qty: 60 TABLET | Refills: 1 | Status: SHIPPED | OUTPATIENT
Start: 2022-05-10 | End: 2022-06-10

## 2022-05-10 NOTE — PROGRESS NOTES
"Chief Complaint  Annual Exam and f/u bipolar disorder    Subjective    History of Present Illness {CC  Problem List  Visit Diagnosis   Encounters  Notes  Medications  Labs  Result Review Imaging  Media :23}     Patient presents to [facility] for   Pt states that she is here for an annual exam and would like to get back ob bipolar medication. Pt states that she has mood changed constantly and she is very emotional. Pt says she sh's trying to figure out who she is.       Review of Systems    I have reviewed and agree with the HPI and ROS information as above.  Lizzy Chang, ROSAMARIA     Objective   Vital Signs:   /80   Pulse 91   Temp 97.1 °F (36.2 °C)   Resp 20   Ht 160 cm (63\")   Wt 79.8 kg (176 lb)   SpO2 97%   BMI 31.18 kg/m²     BMI is >= 30 and <= 34.9 (Class 1 obesity). The following options were offered after discussion: weight loss educational material (shared in after visit summary), exercise counseling/recommendations and nutrition counseling/recommendations      Physical Exam  Constitutional:       Appearance: Normal appearance. She is well-developed. She is obese.   HENT:      Head: Normocephalic and atraumatic.      Right Ear: Tympanic membrane, ear canal and external ear normal.      Left Ear: Tympanic membrane, ear canal and external ear normal.      Nose: Nose normal. No septal deviation, nasal tenderness or congestion.      Mouth/Throat:      Lips: Pink. No lesions.      Mouth: Mucous membranes are moist. No oral lesions.      Dentition: Normal dentition.      Pharynx: Oropharynx is clear. No pharyngeal swelling, oropharyngeal exudate or posterior oropharyngeal erythema.   Eyes:      General: Lids are normal. Vision grossly intact. No scleral icterus.        Right eye: No discharge.         Left eye: No discharge.      Extraocular Movements: Extraocular movements intact.      Conjunctiva/sclera: Conjunctivae normal.      Right eye: Right " conjunctiva is not injected.      Left eye: Left conjunctiva is not injected.      Pupils: Pupils are equal, round, and reactive to light.   Neck:      Thyroid: No thyroid mass.      Trachea: Trachea normal.   Cardiovascular:      Rate and Rhythm: Normal rate and regular rhythm.      Heart sounds: Normal heart sounds. No murmur heard.    No gallop.   Pulmonary:      Effort: Pulmonary effort is normal.      Breath sounds: Normal breath sounds and air entry. No wheezing, rhonchi or rales.   Abdominal:      General: There is no distension.      Palpations: Abdomen is soft. There is no mass.      Tenderness: There is no abdominal tenderness. There is no right CVA tenderness, left CVA tenderness, guarding or rebound.   Musculoskeletal:         General: No tenderness or deformity. Normal range of motion.      Cervical back: Full passive range of motion without pain, normal range of motion and neck supple.      Thoracic back: Normal.      Right lower leg: No edema.      Left lower leg: No edema.   Skin:     General: Skin is warm and dry.      Coloration: Skin is not jaundiced.      Findings: No rash.   Neurological:      Mental Status: She is alert and oriented to person, place, and time.      Cranial Nerves: Cranial nerves are intact.      Sensory: Sensation is intact.      Motor: Motor function is intact.      Coordination: Coordination is intact.      Gait: Gait is intact.      Deep Tendon Reflexes: Reflexes are normal and symmetric.   Psychiatric:         Mood and Affect: Mood and affect normal.         Judgment: Judgment normal.          JOSHUA-7: Over the last two weeks, how often have you been bothered by the following problems?  Feeling nervous, anxious or on edge: Nearly every day  Not being able to stop or control worrying: Nearly every day  Worrying too much about different things: Nearly every day  Trouble Relaxing: Nearly every day  Being so restless that it is hard to sit still: Not at all  Becoming easily  annoyed or irritable: Nearly every day  Feeling afraid as if something awful might happen: Nearly every day  JOSHUA 7 Total Score: 18  If you checked any problems, how difficult have these problems made it for you to do your work, take care of things at home, or get along with other people: Somewhat difficult    PHQ-2 Depression Screening  Little interest or pleasure in doing things? 0-->not at all   Feeling down, depressed, or hopeless? 0-->not at all   PHQ-2 Total Score 0     PHQ-9 Depression Screening  Little interest or pleasure in doing things? 0-->not at all   Feeling down, depressed, or hopeless? 0-->not at all   Trouble falling or staying asleep, or sleeping too much?     Feeling tired or having little energy?     Poor appetite or overeating?     Feeling bad about yourself - or that you are a failure or have let yourself or your family down?     Trouble concentrating on things, such as reading the newspaper or watching television?     Moving or speaking so slowly that other people could have noticed? Or the opposite - being so fidgety or restless that you have been moving around a lot more than usual?     Thoughts that you would be better off dead, or of hurting yourself in some way?     PHQ-9 Total Score 0   If you checked off any problems, how difficult have these problems made it for you to do your work, take care of things at home, or get along with other people?        Result Review  Data Reviewed:                   Assessment and Plan      Problem List Items Addressed This Visit    None     Visit Diagnoses     Wellness examination    -  Primary    Relevant Orders    CBC Auto Differential    Comprehensive Metabolic Panel    Lipid Panel    Urinalysis With Culture If Indicated -    TSH    Hemoglobin A1c    Bipolar 1 disorder (HCC)        Relevant Medications    lamoTRIgine (LaMICtal) 25 MG tablet    Class 1 obesity due to excess calories without serious comorbidity with body mass index (BMI) of 31.0 to 31.9  in adult          Patient comes in today for an annual wellness exam.  She also request to start back on bipolar medications.  She has not been on anything in a couple of years because she was pregnant.  She was treated by us previously for just a month.  After discussing possible medication she does remember Lamictal.  Will start back on this and follow-up in 1 month.  Patient denies suicidal or homicidal thoughts or plans.  Main issues today are that of short fuse and being easily irritated.  Also has anxiety.    Will get lab work up-to-date and call with those results.    Discussed BMI and recommended diet and exercise for weight loss.    The following information was discussed with patient/caregiver at the time of the appointment.    Lamictal (lamotrigine):  Rarely, serious (sometimes fatal) skin rashes have occurred while taking this medication. These rashes are more common in children under 16 than in adults. Rashes may be more likely if you start at too high a dose, if you increase your dose too quickly, or if you take this medication with certain other anti-seizure medications (valproic acid, divalproex). These rashes may occur anytime during use, but most serious rashes have occurred within 2 to 8 weeks of starting lamotrigine. Get medical help right away if you develop any type of skin rash while taking this medication, or if you have other signs of a serious allergic reaction such as hives, fever, swollen lymph glands, painful sores in the mouth or around the eyes, or swelling of the lips or tongue. Your doctor will tell you if you should stop taking lamotrigine. Even after you stop taking this medication, it is still possible for the rash to become life-threatening or cause permanent scars or other problems.    Uses    Lamotrigine is used alone or with other medications to prevent and control seizures. It may also be used to help prevent the extreme mood swings of bipolar disorder in adults and children.  Lamotrigine is known as an anticonvulsant or antiepileptic drug. It is thought to work by restoring the balance of certain natural substances in the brain.    How to Use    Read the Medication Guide and, if available, the Patient Information Leaflet provided by your pharmacist before you start taking lamotrigine and each time you get a refill. If you have any questions, ask your doctor or pharmacist. Take this medication by mouth with or without food as directed by your doctor. Swallow the tablets whole since chewing them may leave a bitter taste. Dosage is based on your medical condition, response to treatment, and use of certain interacting drugs. (See also Drug Interactions section.) For children, the dosage is also based on weight. It is very important to follow your doctor's dosing instructions exactly. The dose must be increased slowly. It may take several weeks or months to reach the best dose for you and to get the full benefit from this medication. Take this medication regularly in order to get the most benefit from it. To help you remember, take it at the same time(s) each day. Do not stop taking this medication without consulting your doctor. Some conditions may become worse when the drug is suddenly stopped. Your dose may need to be gradually decreased. Also, if you have stopped taking this medication, do not restart lamotrigine without consulting your doctor. Tell your doctor if your condition does not improve or if it worsens.    Side Effects    See also Warning section. Dizziness, drowsiness, headache, blurred/double vision, loss of coordination, shaking (tremor), nausea, vomiting, or upset stomach may occur. If any of these effects persist or worsen, tell your doctor or pharmacist promptly. Remember that your doctor has prescribed this medication because he or she has judged that the benefit to you is greater than the risk of side effects. Many people using this medication do not have serious side  effects. A small number of people who take anticonvulsants for any condition (such as seizures, bipolar disorder, pain) may experience depression, suicidal thoughts/attempts, or other mental/mood problems. Tell your doctor right away if you or your family/caregiver notice any unusual/sudden changes in your mood, thoughts, or behavior including signs of depression, suicidal thoughts/attempts, thoughts about harming yourself. Tell your doctor right away if any of these rare but serious side effects occur: fainting, easy or unusual bruising/bleeding, unusual tiredness, signs of infection (such as fever, stiff neck, persistent sore throat), muscle pain/tenderness/weakness, dark urine, yellowing eyes/skin, stomach/abdominal pain, persistent nausea/vomiting, change in the amount of urine. A very serious allergic reaction to this drug is rare. However, get medical help right away if you notice any symptoms of a serious allergic reaction, including: rash, itching/swelling (especially of the face/tongue/throat), severe dizziness, trouble breathing. This is not a complete list of possible side effects. If you notice other effects not listed above, contact your doctor or pharmacist. In the [place] - Call your doctor for medical advice about side effects. You may report side effects to FDA at 0-047-SVV-2625. In [place] - Call your doctor for medical advice about side effects. You may report side effects to [facility] [place] at 1-960.812.1205.    Precautions    Before taking lamotrigine, tell your doctor or pharmacist if you are allergic to it; or if you have any other allergies. This product may contain inactive ingredients, which can cause allergic reactions or other problems. Talk to your pharmacist for more details. Before using this medication, tell your doctor or pharmacist your medical history, especially of: kidney disease, liver disease. This drug may make you dizzy or drowsy or cause blurred vision. Do not drive, use machinery,  or do any activity that requires alertness or clear vision until you are sure you can perform such activities safely. Limit alcoholic beverages. Before having surgery, tell your doctor or dentist about all the products you use (including prescription drugs, nonprescription drugs, and herbal products). Older adults may be more sensitive to the side effects of this drug, especially dizziness, loss of coordination, or fainting. These side effects can increase the risk of falling. During pregnancy, this medication should be used only when clearly needed. It may harm an unborn baby. However, since untreated seizures or mental/mood problems (such as bipolar disorder) are serious conditions that can harm both a pregnant woman and her unborn baby, do not stop taking this medication unless directed by your doctor. If you are planning pregnancy, become pregnant, or think you may be pregnant, immediately talk to your doctor about the benefits and risks of using this medication during pregnancy. Since birth control pills, patches, implants, and injections may not work if taken with this medication (see also Drug Interactions section), discuss reliable forms of birth control with your doctor. This drug passes into breast milk and may have undesirable effects on a nursing infant. Consult your doctor before breast-feeding.    Drug Interaction    Drug interactions may change how your medications work or increase your risk for serious side effects. This document does not contain all possible drug interactions. Keep a list of all the products you use (including prescription/nonprescription drugs and herbal products) and share it with your doctor and pharmacist. Do not start, stop, or change the dosage of any medicines without your doctor's approval. Other medications can affect the removal of lamotrigine from your body, which may affect how lamotrigine works. Examples include hormonal birth control (such as pills, patches),  estrogens, other medications to treat seizures (such as carbamazepine, phenobarbital, phenytoin, primidone, valproic acid), certain HIV protease inhibitors (such as lopinavir/ritonavir, atazanavir/ritonavir), and rifampin, among others. Your doctor may need to adjust your dose of lamotrigine if you are on these medications. This medication may decrease the effectiveness of hormonal birth control products (such as pills, patch, ring). This effect can result in pregnancy. Ask your doctor or pharmacist for details. Discuss whether you should use additional reliable birth control methods while using this medication. Also tell your doctor if you have any new spotting or breakthrough bleeding, because these may be signs that your birth control is not working well. Tell your doctor or pharmacist if you are taking other products that cause drowsiness including alcohol, antihistamines (such as cetirizine, diphenhydramine), drugs for sleep or anxiety (such as alprazolam, diazepam, zolpidem), muscle relaxants, and narcotic pain relievers (such as codeine). Check the labels on all your medicines (such as allergy or cough-and-cold products) because they may contain ingredients that cause drowsiness. Ask your pharmacist about using those products safely. This medication may interfere with certain laboratory tests (including urine drug screening tests), possibly causing false test results. Make sure laboratory personnel and all your doctors know you use this drug.    Overdose    If overdose is suspected, contact a poison control center or emergency room immediately. [place] residents can call their local poison control center at 1-385.619.6568. [place] residents can call a provincial poison control center. Symptoms of overdose may include: severe drowsiness, unusual eye movements, loss of consciousness.    Notes    Do not share this medication with others. Laboratory and/or medical tests (such as liver and kidney function tests,  complete blood count) may be performed periodically to monitor your progress or check for side effects. Consult your doctor for more details. There are different types of this medication available. Some do not have the same effects. There are also some medications that sound the same as this product. Make sure you have the right product before taking it.    Missed Dose    It is important to take each dose at the scheduled time. If you miss a dose, take it as soon as you remember. If it is near the time of the next dose, skip the missed dose and resume your usual dosing schedule. Do not double the dose to catch up.    Storage    Store at room temperature away from light and moisture. Do not store in the bathroom. Keep all medications away from children and pets. Do not flush medications down the toilet or pour them into a drain unless instructed to do so. Properly discard this product when it is  or no longer needed. Consult your pharmacist or local waste disposal company.    Medical Alert    Your condition can cause complications in a medical emergency. For information about enrolling in MedicAlert, call 1-941.846.7925 ([place]) or [phone] ([place]).    Disclaimer    IMPORTANT: HOW TO USE THIS INFORMATION: This is a summary and does NOT have all possible information about this product. This information does not assure that this product is safe, effective, or appropriate for you. This information is not individual medical advice and does not substitute for the advice of your health care professional. Always ask your health care professional for complete information about this product and your specific health needs.    Source  [employer].          Follow Up   Return in about 4 weeks (around 2022).  Patient was given instructions and counseling regarding her condition or for health maintenance advice. Please see specific information pulled into the AVS if appropriate.

## 2022-05-11 LAB — TSH SERPL DL<=0.05 MIU/L-ACNC: 1 UIU/ML (ref 0.27–4.2)

## 2022-05-12 ENCOUNTER — TELEPHONE (OUTPATIENT)
Dept: FAMILY MEDICINE CLINIC | Facility: CLINIC | Age: 27
End: 2022-05-12

## 2022-05-12 LAB — BACTERIA SPEC AEROBE CULT: NORMAL

## 2022-05-12 NOTE — TELEPHONE ENCOUNTER
----- Message from ROSAMARIA Mota sent at 5/12/2022 11:23 AM CDT -----  Urine shows large blood. Culture was okay. If pt was on period then no worries, if not then repeat 1 month. Total cholesterol and ldl/hdl off. Work on diet so won't have to start cholesterol med one day.

## 2022-05-12 NOTE — TELEPHONE ENCOUNTER
Patient called with results of urine shows large blood. Culture was okay.Patient states she was on her period. Total cholesterol and ldl/hdl elevated. Work on diet so won't have to start cholesterol med one day. Dietary education given. DIAN.

## 2022-06-10 ENCOUNTER — OFFICE VISIT (OUTPATIENT)
Dept: FAMILY MEDICINE CLINIC | Facility: CLINIC | Age: 27
End: 2022-06-10

## 2022-06-10 VITALS
TEMPERATURE: 98.7 F | RESPIRATION RATE: 20 BRPM | WEIGHT: 159 LBS | SYSTOLIC BLOOD PRESSURE: 116 MMHG | HEART RATE: 92 BPM | DIASTOLIC BLOOD PRESSURE: 79 MMHG | HEIGHT: 63 IN | BODY MASS INDEX: 28.17 KG/M2

## 2022-06-10 DIAGNOSIS — F31.81 BIPOLAR 2 DISORDER: Primary | ICD-10-CM

## 2022-06-10 PROCEDURE — 99214 OFFICE O/P EST MOD 30 MIN: CPT | Performed by: NURSE PRACTITIONER

## 2022-06-10 RX ORDER — NORETHINDRONE 0.35 MG/1
TABLET ORAL
COMMUNITY
Start: 2022-05-31 | End: 2022-08-29 | Stop reason: SDUPTHER

## 2022-06-10 RX ORDER — LAMOTRIGINE 25 MG/1
50 TABLET ORAL NIGHTLY
Qty: 30 TABLET | Refills: 1 | Status: SHIPPED | OUTPATIENT
Start: 2022-06-10

## 2022-06-10 NOTE — PROGRESS NOTES
"Chief Complaint  bipolar disorder  (Pt was started on lamictal last month. Patient states her mother and sister was both dx with bipolar. Medication is not working )    Subjective    History of Present Illness      Patient presents to [facility] for   Patient states the lamictal is not working. Still having mood issues, mood swings, having a hard time believing what is real and fake, emotional.        Review of Systems   Psychiatric/Behavioral: Positive for sleep disturbance, depressed mood and stress. Negative for suicidal ideas. The patient is nervous/anxious.    All other systems reviewed and are negative.      I have reviewed and agree with the HPI and ROS information as above.  Keyanna Delgadillo, ROSAMARIA     Objective   Vital Signs:   /79   Pulse 92   Temp 98.7 °F (37.1 °C)   Resp 20   Ht 160 cm (63\")   Wt 72.1 kg (159 lb)   BMI 28.17 kg/m²         Physical Exam  Constitutional:       Appearance: Normal appearance. She is well-developed.   HENT:      Head: Normocephalic and atraumatic.      Right Ear: Tympanic membrane, ear canal and external ear normal.      Left Ear: Tympanic membrane, ear canal and external ear normal.      Nose: Nose normal. No septal deviation, nasal tenderness or congestion.      Mouth/Throat:      Lips: Pink. No lesions.      Mouth: Mucous membranes are moist. No oral lesions.      Dentition: Normal dentition.      Pharynx: Oropharynx is clear. No pharyngeal swelling, oropharyngeal exudate or posterior oropharyngeal erythema.   Eyes:      General: Lids are normal. Vision grossly intact. No scleral icterus.        Right eye: No discharge.         Left eye: No discharge.      Extraocular Movements: Extraocular movements intact.      Conjunctiva/sclera: Conjunctivae normal.      Right eye: Right conjunctiva is not injected.      Left eye: Left conjunctiva is not injected.      Pupils: Pupils are equal, round, and reactive to light.   Neck:      Thyroid: " No thyroid mass.      Trachea: Trachea normal.   Cardiovascular:      Rate and Rhythm: Normal rate and regular rhythm.      Heart sounds: Normal heart sounds. No murmur heard.    No gallop.   Pulmonary:      Effort: Pulmonary effort is normal.      Breath sounds: Normal breath sounds and air entry. No wheezing, rhonchi or rales.   Abdominal:      General: There is no distension.      Palpations: Abdomen is soft. There is no mass.      Tenderness: There is no abdominal tenderness. There is no right CVA tenderness, left CVA tenderness, guarding or rebound.   Musculoskeletal:         General: No tenderness or deformity. Normal range of motion.      Cervical back: Full passive range of motion without pain, normal range of motion and neck supple.      Thoracic back: Normal.      Right lower leg: No edema.      Left lower leg: No edema.   Skin:     General: Skin is warm and dry.      Coloration: Skin is not jaundiced.      Findings: No rash.   Neurological:      Mental Status: She is alert and oriented to person, place, and time.      Cranial Nerves: Cranial nerves are intact.      Sensory: Sensation is intact.      Motor: Motor function is intact.      Coordination: Coordination is intact.      Gait: Gait is intact.      Deep Tendon Reflexes: Reflexes are normal and symmetric.   Psychiatric:         Mood and Affect: Affect is tearful.         Judgment: Judgment normal.          PHQ-2 Depression Screening  Little interest or pleasure in doing things? 0-->not at all   Feeling down, depressed, or hopeless? 0-->not at all   PHQ-2 Total Score 0     PHQ-9 Depression Screening  Little interest or pleasure in doing things? 0-->not at all   Feeling down, depressed, or hopeless? 0-->not at all   Trouble falling or staying asleep, or sleeping too much?     Feeling tired or having little energy?     Poor appetite or overeating?     Feeling bad about yourself - or that you are a failure or have let yourself or your family down?    "  Trouble concentrating on things, such as reading the newspaper or watching television?     Moving or speaking so slowly that other people could have noticed? Or the opposite - being so fidgety or restless that you have been moving around a lot more than usual?     Thoughts that you would be better off dead, or of hurting yourself in some way?     PHQ-9 Total Score 0   If you checked off any problems, how difficult have these problems made it for you to do your work, take care of things at home, or get along with other people?        Result Review  Data Reviewed:                   Assessment and Plan      Problem List Items Addressed This Visit    None     Visit Diagnoses     Bipolar 2 disorder (HCC)    -  Primary    Relevant Medications    Cariprazine HCl (Vraylar) 1.5 MG capsule capsule    lamoTRIgine (LaMICtal) 25 MG tablet      Patient presents for mood follow up after restarting lamotrigine last month. She c/o hypomanic episodes, happy for days straight, excessive spending during this time, constant changes, emotional. Her \"low\" days she loses her appetite, isolates herself, fatigue, no motivation. Poor sleep habits.  She questions if she has \"multiple personality disorder \".  She denies any HI SI.  She does not feel any improvement since starting the lamotrigine.  Plan:   1. Continue lamotrigine 50 mg.   2. Start Vraylar, 2 weeks samples given in office today.  Medication counseling was done with patient.  Side effects were discussed.  Questions were answered.  3. She will also set up a second opinion with [facility].    The following information was discussed with patient/caregiver at the time of the appointment.    Lamictal (lamotrigine):  Rarely, serious (sometimes fatal) skin rashes have occurred while taking this medication. These rashes are more common in children under 16 than in adults. Rashes may be more likely if you start at too high a dose, if you increase your dose too quickly, or if " you take this medication with certain other anti-seizure medications (valproic acid, divalproex). These rashes may occur anytime during use, but most serious rashes have occurred within 2 to 8 weeks of starting lamotrigine. Get medical help right away if you develop any type of skin rash while taking this medication, or if you have other signs of a serious allergic reaction such as hives, fever, swollen lymph glands, painful sores in the mouth or around the eyes, or swelling of the lips or tongue. Your doctor will tell you if you should stop taking lamotrigine. Even after you stop taking this medication, it is still possible for the rash to become life-threatening or cause permanent scars or other problems.    Uses    Lamotrigine is used alone or with other medications to prevent and control seizures. It may also be used to help prevent the extreme mood swings of bipolar disorder in adults and children. Lamotrigine is known as an anticonvulsant or antiepileptic drug. It is thought to work by restoring the balance of certain natural substances in the brain.    How to Use    Read the Medication Guide and, if available, the Patient Information Leaflet provided by your pharmacist before you start taking lamotrigine and each time you get a refill. If you have any questions, ask your doctor or pharmacist. Take this medication by mouth with or without food as directed by your doctor. Swallow the tablets whole since chewing them may leave a bitter taste. Dosage is based on your medical condition, response to treatment, and use of certain interacting drugs. (See also Drug Interactions section.) For children, the dosage is also based on weight. It is very important to follow your doctor's dosing instructions exactly. The dose must be increased slowly. It may take several weeks or months to reach the best dose for you and to get the full benefit from this medication. Take this medication regularly in order to get the most  benefit from it. To help you remember, take it at the same time(s) each day. Do not stop taking this medication without consulting your doctor. Some conditions may become worse when the drug is suddenly stopped. Your dose may need to be gradually decreased. Also, if you have stopped taking this medication, do not restart lamotrigine without consulting your doctor. Tell your doctor if your condition does not improve or if it worsens.    Side Effects    See also Warning section. Dizziness, drowsiness, headache, blurred/double vision, loss of coordination, shaking (tremor), nausea, vomiting, or upset stomach may occur. If any of these effects persist or worsen, tell your doctor or pharmacist promptly. Remember that your doctor has prescribed this medication because he or she has judged that the benefit to you is greater than the risk of side effects. Many people using this medication do not have serious side effects. A small number of people who take anticonvulsants for any condition (such as seizures, bipolar disorder, pain) may experience depression, suicidal thoughts/attempts, or other mental/mood problems. Tell your doctor right away if you or your family/caregiver notice any unusual/sudden changes in your mood, thoughts, or behavior including signs of depression, suicidal thoughts/attempts, thoughts about harming yourself. Tell your doctor right away if any of these rare but serious side effects occur: fainting, easy or unusual bruising/bleeding, unusual tiredness, signs of infection (such as fever, stiff neck, persistent sore throat), muscle pain/tenderness/weakness, dark urine, yellowing eyes/skin, stomach/abdominal pain, persistent nausea/vomiting, change in the amount of urine. A very serious allergic reaction to this drug is rare. However, get medical help right away if you notice any symptoms of a serious allergic reaction, including: rash, itching/swelling (especially of the face/tongue/throat), severe  dizziness, trouble breathing. This is not a complete list of possible side effects. If you notice other effects not listed above, contact your doctor or pharmacist. In the [place] - Call your doctor for medical advice about side effects. You may report side effects to FDA at 3-205-EUV-8022. In [place] - Call your doctor for medical advice about side effects. You may report side effects to [facility] [place] at 1-250.279.8247.    Precautions    Before taking lamotrigine, tell your doctor or pharmacist if you are allergic to it; or if you have any other allergies. This product may contain inactive ingredients, which can cause allergic reactions or other problems. Talk to your pharmacist for more details. Before using this medication, tell your doctor or pharmacist your medical history, especially of: kidney disease, liver disease. This drug may make you dizzy or drowsy or cause blurred vision. Do not drive, use machinery, or do any activity that requires alertness or clear vision until you are sure you can perform such activities safely. Limit alcoholic beverages. Before having surgery, tell your doctor or dentist about all the products you use (including prescription drugs, nonprescription drugs, and herbal products). Older adults may be more sensitive to the side effects of this drug, especially dizziness, loss of coordination, or fainting. These side effects can increase the risk of falling. During pregnancy, this medication should be used only when clearly needed. It may harm an unborn baby. However, since untreated seizures or mental/mood problems (such as bipolar disorder) are serious conditions that can harm both a pregnant woman and her unborn baby, do not stop taking this medication unless directed by your doctor. If you are planning pregnancy, become pregnant, or think you may be pregnant, immediately talk to your doctor about the benefits and risks of using this medication during pregnancy. Since birth control pills,  patches, implants, and injections may not work if taken with this medication (see also Drug Interactions section), discuss reliable forms of birth control with your doctor. This drug passes into breast milk and may have undesirable effects on a nursing infant. Consult your doctor before breast-feeding.    Drug Interaction    Drug interactions may change how your medications work or increase your risk for serious side effects. This document does not contain all possible drug interactions. Keep a list of all the products you use (including prescription/nonprescription drugs and herbal products) and share it with your doctor and pharmacist. Do not start, stop, or change the dosage of any medicines without your doctor's approval. Other medications can affect the removal of lamotrigine from your body, which may affect how lamotrigine works. Examples include hormonal birth control (such as pills, patches), estrogens, other medications to treat seizures (such as carbamazepine, phenobarbital, phenytoin, primidone, valproic acid), certain HIV protease inhibitors (such as lopinavir/ritonavir, atazanavir/ritonavir), and rifampin, among others. Your doctor may need to adjust your dose of lamotrigine if you are on these medications. This medication may decrease the effectiveness of hormonal birth control products (such as pills, patch, ring). This effect can result in pregnancy. Ask your doctor or pharmacist for details. Discuss whether you should use additional reliable birth control methods while using this medication. Also tell your doctor if you have any new spotting or breakthrough bleeding, because these may be signs that your birth control is not working well. Tell your doctor or pharmacist if you are taking other products that cause drowsiness including alcohol, antihistamines (such as cetirizine, diphenhydramine), drugs for sleep or anxiety (such as alprazolam, diazepam, zolpidem), muscle relaxants, and narcotic pain  relievers (such as codeine). Check the labels on all your medicines (such as allergy or cough-and-cold products) because they may contain ingredients that cause drowsiness. Ask your pharmacist about using those products safely. This medication may interfere with certain laboratory tests (including urine drug screening tests), possibly causing false test results. Make sure laboratory personnel and all your doctors know you use this drug.    Overdose    If overdose is suspected, contact a poison control center or emergency room immediately. [place] residents can call their local poison control center at [phone]. [place] residents can call a provincial poison control center. Symptoms of overdose may include: severe drowsiness, unusual eye movements, loss of consciousness.    Notes    Do not share this medication with others. Laboratory and/or medical tests (such as liver and kidney function tests, complete blood count) may be performed periodically to monitor your progress or check for side effects. Consult your doctor for more details. There are different types of this medication available. Some do not have the same effects. There are also some medications that sound the same as this product. Make sure you have the right product before taking it.    Missed Dose    It is important to take each dose at the scheduled time. If you miss a dose, take it as soon as you remember. If it is near the time of the next dose, skip the missed dose and resume your usual dosing schedule. Do not double the dose to catch up.    Storage    Store at room temperature away from light and moisture. Do not store in the bathroom. Keep all medications away from children and pets. Do not flush medications down the toilet or pour them into a drain unless instructed to do so. Properly discard this product when it is  or no longer needed. Consult your pharmacist or local waste disposal company.    Medical Alert    Your condition can cause  complications in a medical emergency. For information about enrolling in MedicAlert, call 1-515.199.9172 ([place]) or [phone] ([place]).    Disclaimer    IMPORTANT: HOW TO USE THIS INFORMATION: This is a summary and does NOT have all possible information about this product. This information does not assure that this product is safe, effective, or appropriate for you. This information is not individual medical advice and does not substitute for the advice of your health care professional. Always ask your health care professional for complete information about this product and your specific health needs.    Source  [facility].      Follow Up   Return in about 2 weeks (around 6/24/2022).  Patient was given instructions and counseling regarding her condition or for health maintenance advice. Please see specific information pulled into the AVS if appropriate.

## 2022-06-10 NOTE — PATIENT INSTRUCTIONS
Discharge Instructions - Mood Disorder Follow Up     - You will need to return to the office as instructed for follow up, or sooner if you develop symptoms  - Medication should be taken first thing in the morning  - It is your responsibility to safe guard your medication  - If you develop any symptoms such as headache, changes in weight, loss of appetite, chest pain, palpitations, or change in behavior, please contact our office immediately or go to your local emergency rooms for any emergent needs.  - Your next appointment will be walk in visit.  Dr. Cahrles is here Monday-Thursdays, and you will need to be signed in by 3 pm in order to guarantee your prescription is filled that day.  You may be seen on Fridays or Saturdays for medication follow up as well.

## 2022-06-24 ENCOUNTER — OFFICE VISIT (OUTPATIENT)
Dept: FAMILY MEDICINE CLINIC | Facility: CLINIC | Age: 27
End: 2022-06-24

## 2022-06-24 VITALS
BODY MASS INDEX: 27.39 KG/M2 | OXYGEN SATURATION: 98 % | RESPIRATION RATE: 16 BRPM | DIASTOLIC BLOOD PRESSURE: 78 MMHG | WEIGHT: 154.6 LBS | SYSTOLIC BLOOD PRESSURE: 126 MMHG | HEIGHT: 63 IN | HEART RATE: 79 BPM | TEMPERATURE: 98.4 F

## 2022-06-24 DIAGNOSIS — F31.81 BIPOLAR 2 DISORDER: ICD-10-CM

## 2022-06-24 PROCEDURE — 99212 OFFICE O/P EST SF 10 MIN: CPT | Performed by: NURSE PRACTITIONER

## 2022-06-24 NOTE — PROGRESS NOTES
"Chief Complaint  Manic Behavior (Patient states that she is here today for a 2 WK Fu for her bipolar disorder. Patient states that the medicine is helping. )    Subjective        Ashley Wynn presents to [facility]  Manic Behavior Patient states that she is here today for a 2 WK Fu for her bipolar disorder. Patient states that the medicine is helping.           Objective   Vital Signs:  /78 (BP Location: Right arm, Patient Position: Sitting, Cuff Size: Adult)   Pulse 79   Temp 98.4 °F (36.9 °C) (Temporal)   Resp 16   Ht 160 cm (63\")   Wt 70.1 kg (154 lb 9.6 oz)   SpO2 98%   BMI 27.39 kg/m²   Estimated body mass index is 27.39 kg/m² as calculated from the following:    Height as of this encounter: 160 cm (63\").    Weight as of this encounter: 70.1 kg (154 lb 9.6 oz).        Physical Exam  Vitals and nursing note reviewed.   Constitutional:       Appearance: Normal appearance. She is well-developed.   HENT:      Head: Normocephalic and atraumatic.      Right Ear: Tympanic membrane, ear canal and external ear normal.      Left Ear: Tympanic membrane, ear canal and external ear normal.      Nose: Nose normal. No septal deviation, nasal tenderness or congestion.      Mouth/Throat:      Lips: Pink. No lesions.      Mouth: Mucous membranes are moist. No oral lesions.      Dentition: Normal dentition.      Pharynx: Oropharynx is clear. No pharyngeal swelling, oropharyngeal exudate or posterior oropharyngeal erythema.   Eyes:      General: Lids are normal. Vision grossly intact. No scleral icterus.        Right eye: No discharge.         Left eye: No discharge.      Extraocular Movements: Extraocular movements intact.      Conjunctiva/sclera: Conjunctivae normal.      Right eye: Right conjunctiva is not injected.      Left eye: Left conjunctiva is not injected.      Pupils: Pupils are equal, round, and reactive to light.   Neck:      Thyroid: No thyroid mass.      Trachea: Trachea " normal.   Cardiovascular:      Rate and Rhythm: Normal rate and regular rhythm.      Heart sounds: Normal heart sounds. No murmur heard.    No gallop.   Pulmonary:      Effort: Pulmonary effort is normal.      Breath sounds: Normal breath sounds and air entry. No wheezing, rhonchi or rales.   Musculoskeletal:         General: No tenderness or deformity. Normal range of motion.      Cervical back: Full passive range of motion without pain, normal range of motion and neck supple.      Thoracic back: Normal.      Right lower leg: No edema.      Left lower leg: No edema.   Skin:     General: Skin is warm and dry.      Coloration: Skin is not jaundiced.      Findings: No rash.   Neurological:      Mental Status: She is alert and oriented to person, place, and time.      Cranial Nerves: Cranial nerves are intact.      Sensory: Sensation is intact.      Motor: Motor function is intact.      Coordination: Coordination is intact.      Gait: Gait is intact.      Deep Tendon Reflexes: Reflexes are normal and symmetric.   Psychiatric:         Mood and Affect: Mood and affect normal.         Behavior: Behavior normal.         Judgment: Judgment normal.        Result Review :                Assessment and Plan   Diagnoses and all orders for this visit:    1. Bipolar 2 disorder (HCC)    Patient is here today for a 2-week follow-up after starting Vraylar.  She was having pretty severe manic episodes.  She is also postpartum.  She states that since starting the Vraylar she has done much better.  Manic episodes have calmed down.  She is very happy where she is at right now.  Wants to continue everything the same.  Okay to follow-up in 3 months or sooner if needed.  Patient states that she does not need refills at this time.         Follow Up   Return in about 3 months (around 9/24/2022) for Mood follow up.  Patient was given instructions and counseling regarding her condition or for health maintenance advice. Please see specific  information pulled into the AVS if appropriate.

## 2022-08-29 DIAGNOSIS — F31.81 BIPOLAR 2 DISORDER: ICD-10-CM

## 2022-08-29 NOTE — TELEPHONE ENCOUNTER
Caller: Ashley Wynn    Relationship: Self    Best call back number: [phone]    Requested Prescriptions:   Requested Prescriptions     Pending Prescriptions Disp Refills   • Cariprazine HCl (Vraylar) 1.5 MG capsule capsule 30 capsule 0     Sig: Take 1 capsule by mouth Daily.   • Incassia 0.35 MG tablet 28 tablet         Pharmacy where request should be sent: [facility] #[id number] - [place], [place] - [address] AT ACROSS FROM [facility] [phone] [facility] [phone] FX       Does the patient have less than a 3 day supply:  [x] Yes  [] No    Efrain Huynh Rep   08/29/22 15:47 CDT

## 2022-08-30 RX ORDER — NORETHINDRONE 0.35 MG/1
1 TABLET ORAL DAILY
Qty: 28 TABLET | Refills: 2 | Status: SHIPPED | OUTPATIENT
Start: 2022-08-30

## 2022-09-27 DIAGNOSIS — F31.81 BIPOLAR 2 DISORDER: ICD-10-CM

## 2022-09-27 RX ORDER — CARIPRAZINE 1.5 MG/1
CAPSULE, GELATIN COATED ORAL
Qty: 30 CAPSULE | Refills: 0 | OUTPATIENT
Start: 2022-09-27

## 2023-05-01 ENCOUNTER — OFFICE VISIT (OUTPATIENT)
Dept: FAMILY MEDICINE CLINIC | Facility: CLINIC | Age: 28
End: 2023-05-01
Payer: COMMERCIAL

## 2023-05-01 VITALS
SYSTOLIC BLOOD PRESSURE: 105 MMHG | HEIGHT: 63 IN | WEIGHT: 160 LBS | BODY MASS INDEX: 28.35 KG/M2 | HEART RATE: 73 BPM | DIASTOLIC BLOOD PRESSURE: 72 MMHG | OXYGEN SATURATION: 100 % | TEMPERATURE: 98.5 F

## 2023-05-01 DIAGNOSIS — F31.81 BIPOLAR 2 DISORDER: Primary | ICD-10-CM

## 2023-05-01 DIAGNOSIS — F41.9 ANXIETY: ICD-10-CM

## 2023-05-01 PROCEDURE — 1160F RVW MEDS BY RX/DR IN RCRD: CPT | Performed by: NURSE PRACTITIONER

## 2023-05-01 PROCEDURE — 1159F MED LIST DOCD IN RCRD: CPT | Performed by: NURSE PRACTITIONER

## 2023-05-01 PROCEDURE — 99214 OFFICE O/P EST MOD 30 MIN: CPT | Performed by: NURSE PRACTITIONER

## 2023-05-01 RX ORDER — LAMOTRIGINE 25 MG/1
50 TABLET ORAL NIGHTLY
Qty: 30 TABLET | Refills: 2 | Status: SHIPPED | OUTPATIENT
Start: 2023-05-01

## 2023-05-01 RX ORDER — BUSPIRONE HYDROCHLORIDE 10 MG/1
10 TABLET ORAL 3 TIMES DAILY PRN
Qty: 90 TABLET | Refills: 2 | Status: SHIPPED | OUTPATIENT
Start: 2023-05-01

## 2023-05-01 NOTE — PROGRESS NOTES
"Chief Complaint  Anxiety, Panic Attack, and Bipolar 2 Disorder    Subjective    History of Present Illness      Patient presents to [facility] for   History of Present Illness  Patient is here for follow up on her anxiety and bi polar. States she has been off the medication for the past 2-3 months due to personal family issues. States the medications did work for her. She does have concerns about panic attacks that happen while she is sleeping.        Review of Systems   Constitutional: Negative.    HENT: Negative.    Eyes: Negative.    Respiratory: Negative.    Cardiovascular: Negative.    Gastrointestinal: Negative.    Endocrine: Negative.    Genitourinary: Negative.    Musculoskeletal: Negative.    Skin: Negative.    Allergic/Immunologic: Negative.    Neurological: Negative.    Hematological: Negative.    Psychiatric/Behavioral: Negative.        I have reviewed and agree with the HPI and ROS information as above.  Mona Soto, ROSAMARIA     Objective   Vital Signs:   /72   Pulse 73   Temp 98.5 °F (36.9 °C)   Ht 160 cm (63\")   Wt 72.6 kg (160 lb)   SpO2 100%   BMI 28.34 kg/m²     BMI is >= 25 and <30. (Overweight) The following options were offered after discussion;: weight loss educational material (shared in after visit summary)      Physical Exam  Constitutional:       Appearance: Normal appearance. She is well-developed.   HENT:      Head: Normocephalic and atraumatic.      Right Ear: External ear normal.      Left Ear: External ear normal.      Nose: Nose normal. No nasal tenderness or congestion.      Mouth/Throat:      Lips: Pink. No lesions.      Mouth: Mucous membranes are moist. No oral lesions.      Dentition: Normal dentition.      Pharynx: Oropharynx is clear. No pharyngeal swelling, oropharyngeal exudate or posterior oropharyngeal erythema.   Eyes:      General: Lids are normal. Vision grossly intact. No scleral icterus.        Right eye: No discharge.    "      Left eye: No discharge.      Extraocular Movements: Extraocular movements intact.      Conjunctiva/sclera: Conjunctivae normal.      Right eye: Right conjunctiva is not injected.      Left eye: Left conjunctiva is not injected.      Pupils: Pupils are equal, round, and reactive to light.   Cardiovascular:      Rate and Rhythm: Normal rate and regular rhythm.      Heart sounds: Normal heart sounds. No murmur heard.    No gallop.   Pulmonary:      Effort: Pulmonary effort is normal.      Breath sounds: Normal breath sounds and air entry. No wheezing, rhonchi or rales.   Musculoskeletal:         General: No tenderness or deformity. Normal range of motion.      Cervical back: Full passive range of motion without pain, normal range of motion and neck supple.      Right lower leg: No edema.      Left lower leg: No edema.   Skin:     General: Skin is warm and dry.      Coloration: Skin is not jaundiced.      Findings: No rash.   Neurological:      Mental Status: She is alert and oriented to person, place, and time.      Sensory: Sensation is intact.      Motor: Motor function is intact.      Coordination: Coordination is intact.      Gait: Gait is intact.   Psychiatric:         Attention and Perception: Attention normal.         Mood and Affect: Mood and affect normal.         Behavior: Behavior is not hyperactive. Behavior is cooperative.         Thought Content: Thought content normal.         Judgment: Judgment normal.          JOSHUA-7: Over the last two weeks, how often have you been bothered by the following problems?  Feeling nervous, anxious or on edge: Nearly every day  Not being able to stop or control worrying: Nearly every day  Worrying too much about different things: Nearly every day  Trouble Relaxing: Nearly every day  Being so restless that it is hard to sit still: Nearly every day  Becoming easily annoyed or irritable: Nearly every day  Feeling afraid as if something awful might happen: Nearly every  day  JOSHUA 7 Total Score: 21  If you checked any problems, how difficult have these problems made it for you to do your work, take care of things at home, or get along with other people: Extremely difficult    PHQ-2 Depression Screening  Little interest or pleasure in doing things? 0-->not at all   Feeling down, depressed, or hopeless? 0-->not at all   PHQ-2 Total Score 0     PHQ-9 Depression Screening  Little interest or pleasure in doing things? 0-->not at all   Feeling down, depressed, or hopeless? 0-->not at all   Trouble falling or staying asleep, or sleeping too much? 0-->not at all   Feeling tired or having little energy? 0-->not at all   Poor appetite or overeating? 0-->not at all   Feeling bad about yourself - or that you are a failure or have let yourself or your family down? 0-->not at all   Trouble concentrating on things, such as reading the newspaper or watching television? 0-->not at all   Moving or speaking so slowly that other people could have noticed? Or the opposite - being so fidgety or restless that you have been moving around a lot more than usual? 0-->not at all   Thoughts that you would be better off dead, or of hurting yourself in some way? 0-->not at all   PHQ-9 Total Score 0   If you checked off any problems, how difficult have these problems made it for you to do your work, take care of things at home, or get along with other people? not difficult at all      Result Review  Data Reviewed:              Assessment and Plan      Diagnoses and all orders for this visit:    1. Bipolar 2 disorder (Primary)  -     Cariprazine HCl (Vraylar) 1.5 MG capsule capsule; Take 1 capsule by mouth Daily.  Dispense: 30 capsule; Refill: 2  -     lamoTRIgine (LaMICtal) 25 MG tablet; Take 2 tablets by mouth Every Night.  Dispense: 30 tablet; Refill: 2    2. Anxiety  -     busPIRone (BUSPAR) 10 MG tablet; Take 1 tablet by mouth 3 (Three) Times a Day As Needed (anxiety).  Dispense: 90 tablet; Refill:  2      Patient here today requesting to restart mood and anxiety medications.  She has been off of her medications for 3 to 4 months.  She states she did very well when she was taking Vraylar and Lamictal.  Since she has been off of her medication she has noticed some increased anxiety as well.  She states she has panic attacks and sometimes they are occurring at night.  Denies SI/HI, or thoughts of self-harm.    Plan:    1.  Patient would like to restart Vraylar and Lamictal that she has taken in the past.  Start Vraylar 1.5 mg daily and Lamictal 50 mg daily.  Instructed to start Lamictal 25 mg x 1 week then increase to 50 mg.  2.  We will also start BuSpar 10 mg 3 times a day as needed for anxiety.  Medication counseling done and dosing instructions discussed.  3.  Follow-up 1-3 months.    Follow Up   Return in about 3 months (around 8/1/2023) for Recheck.  Patient was given instructions and counseling regarding her condition or for health maintenance advice. Please see specific information pulled into the AVS if appropriate.

## 2023-05-18 NOTE — LACTATION NOTE
"Mother's Name: Ashley  Contact Number: [phone]  G/P:   Breastfeeding Hx: N/A   Significant Medical History: None  Maternal Breast Assessment: Filling, soft, no hard areas, bruising to bilateral nipples noted, easily expresses     Infant's Name: Susy Hui  Date of Birth: [birth year]  Gestational age at Birth: 39w6d  Age: 4 days  Physician: Devin Barrett                     Reason for Visit: [facility]          Infant's Birth weight: 6-12.3 (3070g)  Previous Weight: 6-3.5  (2822g) 20  Wt Loss: -8.08%    Today's Weight: 6-8.0 (2948g)    Wt Loss: -4%    Feeding History Since Discharge/Last Lactation Appt.: Breastfeeding every 2-3 hours for 5-30 minutes on each breast. Mother reports latching difficulties at times. State she feels infant is not latching \"deep enough\".     Past 24 Hours Voids/Stools:   4+/4+     Color of Stool: Yellow    Pre Weight: 2964g  Post Weight: 3010g           Left Breast: 4 minutes--gained 8 grams          Right Breast: 8 minutes--gained 38 grams                        Total Minutes:   12 minutes    Total Weight Gain:   46  gms    Average Feeding Amount for Age: 1-1/2 ounce (30-45 ml)    Interventions: Minimally assisted with deep latching. Infant latched well. Deep jaw drops with gulping noted. Pain denied with latch. Praise, encouragement, support, and education provided.     Education: Positioning, hand expressing, nipple care, adequate feedings/voids/stools, engorgement, mastitis, milk supply, pumping, when to introduce bottle feeding    Notified MD/ Orders Received: N/A    Feeding Plan: Continue breastfeeding on demand, at least 8 times every 24 hours    Plan of Care:    Interventions accomplished satisfactorily, requires no further action.      Future Appointments:    Lactation: As needed     Physician: Devin Barrett Tuesday, 20 at 0945    Signature: Caitlyn Wilks RN, IBCLC        "
Yes

## 2023-07-26 ENCOUNTER — OFFICE VISIT (OUTPATIENT)
Dept: FAMILY MEDICINE CLINIC | Facility: CLINIC | Age: 28
End: 2023-07-26
Payer: COMMERCIAL

## 2023-07-26 VITALS
HEIGHT: 63 IN | BODY MASS INDEX: 31.01 KG/M2 | RESPIRATION RATE: 20 BRPM | SYSTOLIC BLOOD PRESSURE: 112 MMHG | DIASTOLIC BLOOD PRESSURE: 69 MMHG | WEIGHT: 175 LBS | HEART RATE: 75 BPM

## 2023-07-26 DIAGNOSIS — F31.81 BIPOLAR 2 DISORDER: Primary | ICD-10-CM

## 2023-07-26 DIAGNOSIS — F41.8 MIXED ANXIETY AND DEPRESSIVE DISORDER: ICD-10-CM

## 2023-07-26 DIAGNOSIS — E66.1 CLASS 1 DRUG-INDUCED OBESITY WITH BODY MASS INDEX (BMI) OF 31.0 TO 31.9 IN ADULT, UNSPECIFIED WHETHER SERIOUS COMORBIDITY PRESENT: ICD-10-CM

## 2023-07-26 PROCEDURE — 1160F RVW MEDS BY RX/DR IN RCRD: CPT | Performed by: NURSE PRACTITIONER

## 2023-07-26 PROCEDURE — 99214 OFFICE O/P EST MOD 30 MIN: CPT | Performed by: NURSE PRACTITIONER

## 2023-07-26 PROCEDURE — 1159F MED LIST DOCD IN RCRD: CPT | Performed by: NURSE PRACTITIONER

## 2023-07-26 RX ORDER — LAMOTRIGINE 25 MG/1
50 TABLET ORAL NIGHTLY
Qty: 30 TABLET | Refills: 2 | Status: SHIPPED | OUTPATIENT
Start: 2023-07-26

## 2023-07-26 RX ORDER — VENLAFAXINE HYDROCHLORIDE 37.5 MG/1
37.5 CAPSULE, EXTENDED RELEASE ORAL DAILY
Qty: 30 CAPSULE | Refills: 1 | Status: SHIPPED | OUTPATIENT
Start: 2023-07-26

## 2023-07-26 NOTE — PATIENT INSTRUCTIONS
Discharge Instructions - Mood Disorder Follow Up     - You will need to return to the office as instructed for follow up, or sooner if you develop symptoms  - Medication should be taken first thing in the morning  - It is your responsibility to safe guard your medication  - If you develop any symptoms such as headache, changes in weight, loss of appetite, chest pain, palpitations, or change in behavior, please contact our office immediately or go to your local emergency rooms for any emergent needs.  - Your next appointment will be walk in visit.  Dr. Charles is here Monday-Thursdays, and you will need to be signed in by 3 pm in order to guarantee your prescription is filled that day.  You may be seen on Fridays or Saturdays for medication follow up as well.

## 2023-07-26 NOTE — PROGRESS NOTES
"Chief Complaint  Anxiety and Bipolar 2 Disorder    Subjective    History of Present Illness      Patient presents to [facility] for   History of Present Illness  Mood follow up. Pt is here today for a 3 month f/u after restarting Lamictal and Vraylar.  Pt also started Buspar but reports that it makes her feel overheated and nauseous. Pt has improved as far as moods but is steal dealing with anxiety. States she gets overwhelmed easily and will overeat or under eat. She is desiring to lose weight as she is getting  in October.      Review of Systems    I have reviewed and agree with the HPI and ROS information as above.  Keyanna Delgadillo, APRN     Objective   Vital Signs:   /69   Pulse 75   Resp 20   Ht 160 cm (63\")   Wt 79.4 kg (175 lb)   BMI 31.00 kg/m²            Physical Exam  Constitutional:       Appearance: She is well-developed. She is obese.   HENT:      Head: Normocephalic and atraumatic.      Right Ear: External ear normal.      Left Ear: External ear normal.      Nose: Nose normal. No septal deviation, nasal tenderness or congestion.      Mouth/Throat:      Lips: Pink. No lesions.      Mouth: Mucous membranes are moist. No oral lesions.      Dentition: Normal dentition.      Pharynx: Oropharynx is clear. No pharyngeal swelling, oropharyngeal exudate or posterior oropharyngeal erythema.   Eyes:      General: Lids are normal. Vision grossly intact. No scleral icterus.        Right eye: No discharge.         Left eye: No discharge.      Extraocular Movements: Extraocular movements intact.      Conjunctiva/sclera: Conjunctivae normal.      Right eye: Right conjunctiva is not injected.      Left eye: Left conjunctiva is not injected.      Pupils: Pupils are equal, round, and reactive to light.   Neck:      Thyroid: No thyroid mass.      Trachea: Trachea normal.   Cardiovascular:      Rate and Rhythm: Normal rate and regular rhythm.      Heart sounds: Normal heart " sounds. No murmur heard.    No gallop.   Pulmonary:      Effort: Pulmonary effort is normal.      Breath sounds: Normal breath sounds and air entry. No wheezing, rhonchi or rales.   Abdominal:      General: There is no distension.      Palpations: Abdomen is soft. There is no mass.      Tenderness: There is no abdominal tenderness. There is no right CVA tenderness, left CVA tenderness, guarding or rebound.   Musculoskeletal:         General: No tenderness or deformity. Normal range of motion.      Cervical back: Full passive range of motion without pain, normal range of motion and neck supple.      Thoracic back: Normal.      Right lower leg: No edema.      Left lower leg: No edema.   Skin:     General: Skin is warm and dry.      Coloration: Skin is not jaundiced.      Findings: No rash.   Neurological:      Mental Status: She is alert and oriented to person, place, and time.      Sensory: Sensation is intact.      Motor: Motor function is intact.      Coordination: Coordination is intact.      Gait: Gait is intact.      Deep Tendon Reflexes: Reflexes are normal and symmetric.   Psychiatric:         Mood and Affect: Mood and affect normal.         Judgment: Judgment normal.        JOSHUA-7: Over the last two weeks, how often have you been bothered by the following problems?  Feeling nervous, anxious or on edge: Several days  Not being able to stop or control worrying: Several days  Worrying too much about different things: Not at all  Trouble Relaxing: Not at all  Being so restless that it is hard to sit still: Not at all  Becoming easily annoyed or irritable: Not at all  Feeling afraid as if something awful might happen: Not at all  JOSHUA 7 Total Score: 2  If you checked any problems, how difficult have these problems made it for you to do your work, take care of things at home, or get along with other people: Not difficult at all    PHQ-2 Depression Screening  Little interest or pleasure in doing things? 0-->not at all    Feeling down, depressed, or hopeless? 0-->not at all   PHQ-2 Total Score 0     PHQ-9 Depression Screening  Little interest or pleasure in doing things? 0-->not at all   Feeling down, depressed, or hopeless? 0-->not at all   Trouble falling or staying asleep, or sleeping too much?     Feeling tired or having little energy?     Poor appetite or overeating?     Feeling bad about yourself - or that you are a failure or have let yourself or your family down?     Trouble concentrating on things, such as reading the newspaper or watching television?     Moving or speaking so slowly that other people could have noticed? Or the opposite - being so fidgety or restless that you have been moving around a lot more than usual?     Thoughts that you would be better off dead, or of hurting yourself in some way?     PHQ-9 Total Score 0   If you checked off any problems, how difficult have these problems made it for you to do your work, take care of things at home, or get along with other people?        Result Review  Data Reviewed:                   Assessment and Plan      Diagnoses and all orders for this visit:    1. Bipolar 2 disorder (Primary)  -     Cariprazine HCl (Vraylar) 1.5 MG capsule capsule; Take 1 capsule by mouth Daily.  Dispense: 30 capsule; Refill: 2  -     lamoTRIgine (LaMICtal) 25 MG tablet; Take 2 tablets by mouth Every Night.  Dispense: 30 tablet; Refill: 2    2. Mixed anxiety and depressive disorder  -     venlafaxine XR (Effexor XR) 37.5 MG 24 hr capsule; Take 1 capsule by mouth Daily.  Dispense: 30 capsule; Refill: 1    3. 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   Start low dose effexor in the mornings.   Consider course of phentermine next month as long as moods are improving.   The following information was discussed with patient/caregiver at the time of the  appointment.    Lamictal (lamotrigine):  Rarely, serious (sometimes fatal) skin rashes have occurred while taking this medication. These rashes are more common in children under 16 than in adults. Rashes may be more likely if you start at too high a dose, if you increase your dose too quickly, or if you take this medication with certain other anti-seizure medications (valproic acid, divalproex). These rashes may occur anytime during use, but most serious rashes have occurred within 2 to 8 weeks of starting lamotrigine. Get medical help right away if you develop any type of skin rash while taking this medication, or if you have other signs of a serious allergic reaction such as hives, fever, swollen lymph glands, painful sores in the mouth or around the eyes, or swelling of the lips or tongue. Your doctor will tell you if you should stop taking lamotrigine. Even after you stop taking this medication, it is still possible for the rash to become life-threatening or cause permanent scars or other problems.    Uses    Lamotrigine is used alone or with other medications to prevent and control seizures. It may also be used to help prevent the extreme mood swings of bipolar disorder in adults and children. Lamotrigine is known as an anticonvulsant or antiepileptic drug. It is thought to work by restoring the balance of certain natural substances in the brain.    How to Use    Read the Medication Guide and, if available, the Patient Information Leaflet provided by your pharmacist before you start taking lamotrigine and each time you get a refill. If you have any questions, ask your doctor or pharmacist. Take this medication by mouth with or without food as directed by your doctor. Swallow the tablets whole since chewing them may leave a bitter taste. Dosage is based on your medical condition, response to treatment, and use of certain interacting drugs. (See also Drug Interactions section.) For children, the dosage is also  based on weight. It is very important to follow your doctor's dosing instructions exactly. The dose must be increased slowly. It may take several weeks or months to reach the best dose for you and to get the full benefit from this medication. Take this medication regularly in order to get the most benefit from it. To help you remember, take it at the same time(s) each day. Do not stop taking this medication without consulting your doctor. Some conditions may become worse when the drug is suddenly stopped. Your dose may need to be gradually decreased. Also, if you have stopped taking this medication, do not restart lamotrigine without consulting your doctor. Tell your doctor if your condition does not improve or if it worsens.    Side Effects    See also Warning section. Dizziness, drowsiness, headache, blurred/double vision, loss of coordination, shaking (tremor), nausea, vomiting, or upset stomach may occur. If any of these effects persist or worsen, tell your doctor or pharmacist promptly. Remember that your doctor has prescribed this medication because he or she has judged that the benefit to you is greater than the risk of side effects. Many people using this medication do not have serious side effects. A small number of people who take anticonvulsants for any condition (such as seizures, bipolar disorder, pain) may experience depression, suicidal thoughts/attempts, or other mental/mood problems. Tell your doctor right away if you or your family/caregiver notice any unusual/sudden changes in your mood, thoughts, or behavior including signs of depression, suicidal thoughts/attempts, thoughts about harming yourself. Tell your doctor right away if any of these rare but serious side effects occur: fainting, easy or unusual bruising/bleeding, unusual tiredness, signs of infection (such as fever, stiff neck, persistent sore throat), muscle pain/tenderness/weakness, dark urine, yellowing eyes/skin, stomach/abdominal  pain, persistent nausea/vomiting, change in the amount of urine. A very serious allergic reaction to this drug is rare. However, get medical help right away if you notice any symptoms of a serious allergic reaction, including: rash, itching/swelling (especially of the face/tongue/throat), severe dizziness, trouble breathing. This is not a complete list of possible side effects. If you notice other effects not listed above, contact your doctor or pharmacist. In the [place] - Call your doctor for medical advice about side effects. You may report side effects to FDA at 0-652-KXM-3261. In [place] - Call your doctor for medical advice about side effects. You may report side effects to [facility] [place] at 1-429.360.9435.    Precautions    Before taking lamotrigine, tell your doctor or pharmacist if you are allergic to it; or if you have any other allergies. This product may contain inactive ingredients, which can cause allergic reactions or other problems. Talk to your pharmacist for more details. Before using this medication, tell your doctor or pharmacist your medical history, especially of: kidney disease, liver disease. This drug may make you dizzy or drowsy or cause blurred vision. Do not drive, use machinery, or do any activity that requires alertness or clear vision until you are sure you can perform such activities safely. Limit alcoholic beverages. Before having surgery, tell your doctor or dentist about all the products you use (including prescription drugs, nonprescription drugs, and herbal products). Older adults may be more sensitive to the side effects of this drug, especially dizziness, loss of coordination, or fainting. These side effects can increase the risk of falling. During pregnancy, this medication should be used only when clearly needed. It may harm an unborn baby. However, since untreated seizures or mental/mood problems (such as bipolar disorder) are serious conditions that can harm both a pregnant woman  and her unborn baby, do not stop taking this medication unless directed by your doctor. If you are planning pregnancy, become pregnant, or think you may be pregnant, immediately talk to your doctor about the benefits and risks of using this medication during pregnancy. Since birth control pills, patches, implants, and injections may not work if taken with this medication (see also Drug Interactions section), discuss reliable forms of birth control with your doctor. This drug passes into breast milk and may have undesirable effects on a nursing infant. Consult your doctor before breast-feeding.    Drug Interaction    Drug interactions may change how your medications work or increase your risk for serious side effects. This document does not contain all possible drug interactions. Keep a list of all the products you use (including prescription/nonprescription drugs and herbal products) and share it with your doctor and pharmacist. Do not start, stop, or change the dosage of any medicines without your doctor's approval. Other medications can affect the removal of lamotrigine from your body, which may affect how lamotrigine works. Examples include hormonal birth control (such as pills, patches), estrogens, other medications to treat seizures (such as carbamazepine, phenobarbital, phenytoin, primidone, valproic acid), certain HIV protease inhibitors (such as lopinavir/ritonavir, atazanavir/ritonavir), and rifampin, among others. Your doctor may need to adjust your dose of lamotrigine if you are on these medications. This medication may decrease the effectiveness of hormonal birth control products (such as pills, patch, ring). This effect can result in pregnancy. Ask your doctor or pharmacist for details. Discuss whether you should use additional reliable birth control methods while using this medication. Also tell your doctor if you have any new spotting or breakthrough bleeding, because these may be signs that your  birth control is not working well. Tell your doctor or pharmacist if you are taking other products that cause drowsiness including alcohol, antihistamines (such as cetirizine, diphenhydramine), drugs for sleep or anxiety (such as alprazolam, diazepam, zolpidem), muscle relaxants, and narcotic pain relievers (such as codeine). Check the labels on all your medicines (such as allergy or cough-and-cold products) because they may contain ingredients that cause drowsiness. Ask your pharmacist about using those products safely. This medication may interfere with certain laboratory tests (including urine drug screening tests), possibly causing false test results. Make sure laboratory personnel and all your doctors know you use this drug.    Overdose    If overdose is suspected, contact a poison control center or emergency room immediately. [place] residents can call their local poison control center at [phone]. [place] residents can call a provincial poison control center. Symptoms of overdose may include: severe drowsiness, unusual eye movements, loss of consciousness.    Notes    Do not share this medication with others. Laboratory and/or medical tests (such as liver and kidney function tests, complete blood count) may be performed periodically to monitor your progress or check for side effects. Consult your doctor for more details. There are different types of this medication available. Some do not have the same effects. There are also some medications that sound the same as this product. Make sure you have the right product before taking it.    Missed Dose    It is important to take each dose at the scheduled time. If you miss a dose, take it as soon as you remember. If it is near the time of the next dose, skip the missed dose and resume your usual dosing schedule. Do not double the dose to catch up.    Storage    Store at room temperature away from light and moisture. Do not store in the bathroom. Keep all  medications away from children and pets. Do not flush medications down the toilet or pour them into a drain unless instructed to do so. Properly discard this product when it is  or no longer needed. Consult your pharmacist or local waste disposal company.    Medical Alert    Your condition can cause complications in a medical emergency. For information about enrolling in MedicAlert, call 1-116.496.7362 ([place]) or [phone] ([place]).    Disclaimer    IMPORTANT: HOW TO USE THIS INFORMATION: This is a summary and does NOT have all possible information about this product. This information does not assure that this product is safe, effective, or appropriate for you. This information is not individual medical advice and does not substitute for the advice of your health care professional. Always ask your health care professional for complete information about this product and your specific health needs.    Source  [employer].         Follow Up   Return in about 1 month (around 2023).  Patient was given instructions and counseling regarding her condition or for health maintenance advice. Please see specific information pulled into the AVS if appropriate.

## 2023-08-24 ENCOUNTER — OFFICE VISIT (OUTPATIENT)
Dept: FAMILY MEDICINE CLINIC | Facility: CLINIC | Age: 28
End: 2023-08-24
Payer: COMMERCIAL

## 2023-08-24 VITALS
WEIGHT: 176 LBS | BODY MASS INDEX: 31.18 KG/M2 | HEART RATE: 73 BPM | DIASTOLIC BLOOD PRESSURE: 79 MMHG | TEMPERATURE: 98.2 F | RESPIRATION RATE: 20 BRPM | HEIGHT: 63 IN | SYSTOLIC BLOOD PRESSURE: 124 MMHG | OXYGEN SATURATION: 99 %

## 2023-08-24 DIAGNOSIS — F41.8 MIXED ANXIETY AND DEPRESSIVE DISORDER: ICD-10-CM

## 2023-08-24 DIAGNOSIS — E66.09 CLASS 1 OBESITY DUE TO EXCESS CALORIES WITHOUT SERIOUS COMORBIDITY WITH BODY MASS INDEX (BMI) OF 31.0 TO 31.9 IN ADULT: ICD-10-CM

## 2023-08-24 DIAGNOSIS — E55.9 VITAMIN D DEFICIENCY: ICD-10-CM

## 2023-08-24 DIAGNOSIS — Z00.00 WELLNESS EXAMINATION: Primary | ICD-10-CM

## 2023-08-24 DIAGNOSIS — F31.81 BIPOLAR 2 DISORDER: ICD-10-CM

## 2023-08-24 RX ORDER — VENLAFAXINE HYDROCHLORIDE 37.5 MG/1
37.5 CAPSULE, EXTENDED RELEASE ORAL DAILY
Qty: 30 CAPSULE | Refills: 2 | Status: SHIPPED | OUTPATIENT
Start: 2023-08-24

## 2023-08-24 RX ORDER — LAMOTRIGINE 25 MG/1
50 TABLET ORAL NIGHTLY
Qty: 30 TABLET | Refills: 2 | Status: SHIPPED | OUTPATIENT
Start: 2023-08-24

## 2023-08-24 RX ORDER — PHENTERMINE HYDROCHLORIDE 37.5 MG/1
37.5 CAPSULE ORAL EVERY MORNING
Qty: 30 CAPSULE | Refills: 2 | Status: SHIPPED | OUTPATIENT
Start: 2023-08-24

## 2023-08-24 NOTE — PROGRESS NOTES
"Chief Complaint  Anxiety, Depression, and Annual Exam    Subjective    History of Present Illness      Patient presents to [facility] for   History of Present Illness  Pt is here today for a 1 month f/u after starting Effexor last visit.  Pt reports it's helped her anxiety a lot.     Review of Systems    I have reviewed and agree with the HPI information as above.  Mona Cohen, APRN     Objective   Vital Signs:   /79   Pulse 73   Temp 98.2 øF (36.8 øC)   Resp 20   Ht 160 cm (63\")   Wt 79.8 kg (176 lb)   SpO2 99%   BMI 31.18 kg/mý            Physical Exam  Vitals and nursing note reviewed.   Constitutional:       Appearance: Normal appearance. She is well-developed. She is obese.   HENT:      Head: Normocephalic and atraumatic.      Right Ear: Tympanic membrane, ear canal and external ear normal.      Left Ear: Tympanic membrane, ear canal and external ear normal.      Nose: Nose normal. No septal deviation, nasal tenderness or congestion.      Mouth/Throat:      Lips: Pink. No lesions.      Mouth: Mucous membranes are moist. No oral lesions.      Dentition: Normal dentition.      Pharynx: Oropharynx is clear. No pharyngeal swelling, oropharyngeal exudate or posterior oropharyngeal erythema.   Eyes:      General: Lids are normal. Vision grossly intact. No scleral icterus.        Right eye: No discharge.         Left eye: No discharge.      Extraocular Movements: Extraocular movements intact.      Conjunctiva/sclera: Conjunctivae normal.      Right eye: Right conjunctiva is not injected.      Left eye: Left conjunctiva is not injected.      Pupils: Pupils are equal, round, and reactive to light.   Neck:      Thyroid: No thyroid mass.      Trachea: Trachea normal.   Cardiovascular:      Rate and Rhythm: Normal rate and regular rhythm.      Heart sounds: Normal heart sounds. No murmur heard.    No gallop.   Pulmonary:      Effort: Pulmonary effort is normal.      " Breath sounds: Normal breath sounds and air entry. No wheezing, rhonchi or rales.   Musculoskeletal:         General: No tenderness or deformity. Normal range of motion.      Cervical back: Full passive range of motion without pain, normal range of motion and neck supple.      Thoracic back: Normal.      Right lower leg: No edema.      Left lower leg: No edema.   Skin:     General: Skin is warm and dry.      Coloration: Skin is not jaundiced.      Findings: No rash.   Neurological:      Mental Status: She is alert and oriented to person, place, and time.      Sensory: Sensation is intact.      Motor: Motor function is intact.      Coordination: Coordination is intact.      Gait: Gait is intact.      Deep Tendon Reflexes: Reflexes are normal and symmetric.   Psychiatric:         Mood and Affect: Mood and affect normal.         Behavior: Behavior normal.         Judgment: Judgment normal.        JOSHUA-7: Over the last two weeks, how often have you been bothered by the following problems?  Feeling nervous, anxious or on edge: Several days  Not being able to stop or control worrying: Not at all  Worrying too much about different things: Not at all  Trouble Relaxing: Not at all  Being so restless that it is hard to sit still: Not at all  Becoming easily annoyed or irritable: Not at all  Feeling afraid as if something awful might happen: Not at all  JOSHUA 7 Total Score: 1  If you checked any problems, how difficult have these problems made it for you to do your work, take care of things at home, or get along with other people: Not difficult at all    PHQ-2 Depression Screening  Little interest or pleasure in doing things? 0-->not at all   Feeling down, depressed, or hopeless? 0-->not at all   PHQ-2 Total Score 0     PHQ-9 Depression Screening  Little interest or pleasure in doing things? 0-->not at all   Feeling down, depressed, or hopeless? 0-->not at all   Trouble falling or staying asleep, or sleeping too much?     Feeling  tired or having little energy?     Poor appetite or overeating?     Feeling bad about yourself - or that you are a failure or have let yourself or your family down?     Trouble concentrating on things, such as reading the newspaper or watching television?     Moving or speaking so slowly that other people could have noticed? Or the opposite - being so fidgety or restless that you have been moving around a lot more than usual?     Thoughts that you would be better off dead, or of hurting yourself in some way?     PHQ-9 Total Score 0   If you checked off any problems, how difficult have these problems made it for you to do your work, take care of things at home, or get along with other people?        Result Review  Data Reviewed:                   Assessment and Plan      Diagnoses and all orders for this visit:    1. Wellness examination (Primary)  -     CBC Auto Differential; Future  -     Comprehensive Metabolic Panel; Future  -     Lipid Panel; Future  -     TSH; Future  -     Urinalysis With Culture If Indicated - Urine, Clean Catch; Future  -     Vitamin D,25-Hydroxy; Future    2. Vitamin D deficiency  -     Vitamin D,25-Hydroxy; Future    3. Bipolar 2 disorder  -     Cariprazine HCl (Vraylar) 1.5 MG capsule capsule; Take 1 capsule by mouth Daily.  Dispense: 30 capsule; Refill: 2  -     lamoTRIgine (LaMICtal) 25 MG tablet; Take 2 tablets by mouth Every Night.  Dispense: 30 tablet; Refill: 2    4. Mixed anxiety and depressive disorder  -     venlafaxine XR (Effexor XR) 37.5 MG 24 hr capsule; Take 1 capsule by mouth Daily.  Dispense: 30 capsule; Refill: 2    5. Class 1 obesity due to excess calories without serious comorbidity with body mass index (BMI) of 31.0 to 31.9 in adult  -     phentermine 37.5 MG capsule; Take 1 capsule by mouth Every Morning.  Dispense: 30 capsule; Refill: 2      Patient is here for a medication follow-up and wellness exam.  She states that the Effexor is working wonderful to help control  her anxiety.  She states that her mood is also well controlled with the Vraylar and Lamictal.  She states that the only thing that she is nervous and anxious about is her upcoming wedding and planning this.  I explained to her that this is very understandable.  Patient is wanting to try phentermine at this time as discussed in the last visit to help her lose weight prior to her wedding.  Plan  1.  Continue Knechtel, Vraylar, Effexor the same  2.  Start phentermine 37.5 g  3.  Labs today        Follow Up   Return in about 3 months (around 11/24/2023) for Mood follow up.  Patient was given instructions and counseling regarding her condition or for health maintenance advice. Please see specific information pulled into the AVS if appropriate.     Preventive Care 21-39 Years Old, Female  Preventive care refers to lifestyle choices and visits with your health care provider that can promote health and wellness. Preventive care visits are also called wellness exams.  What can I expect for my preventive care visit?  Counseling  During your preventive care visit, your health care provider may ask about your:  Medical history, including:  Past medical problems.  Family medical history.  Pregnancy history.  Current health, including:  Menstrual cycle.  Method of birth control.  Emotional well-being.  Home life and relationship well-being.  Sexual activity and sexual health.  Lifestyle, including:  Alcohol, nicotine or tobacco, and drug use.  Access to firearms.  Diet, exercise, and sleep habits.  Work and work environment.  Sunscreen use.  Safety issues such as seatbelt and bike helmet use.  Physical exam  Your health care provider may check your:  Height and weight. These may be used to calculate your BMI (body mass index). BMI is a measurement that tells if you are at a healthy weight.  Waist circumference. This measures the distance around your waistline. This measurement also tells if you are at a healthy weight and may  help predict your risk of certain diseases, such as type 2 diabetes and high blood pressure.  Heart rate and blood pressure.  Body temperature.  Skin for abnormal spots.  What immunizations do I need?    Vaccines are usually given at various ages, according to a schedule. Your health care provider will recommend vaccines for you based on your age, medical history, and lifestyle or other factors, such as travel or where you work.  What tests do I need?  Screening  Your health care provider may recommend screening tests for certain conditions. This may include:  Pelvic exam and Pap test.  Lipid and cholesterol levels.  Diabetes screening. This is done by checking your blood sugar (glucose) after you have not eaten for a while (fasting).  Hepatitis B test.  Hepatitis C test.  HIV (human immunodeficiency virus) test.  STI (sexually transmitted infection) testing, if you are at risk.  BRCA-related cancer screening. This may be done if you have a family history of breast, ovarian, tubal, or peritoneal cancers.  Talk with your health care provider about your test results, treatment options, and if necessary, the need for more tests.  Follow these instructions at home:  Eating and drinking    Eat a healthy diet that includes fresh fruits and vegetables, whole grains, lean protein, and low-fat dairy products.  Take vitamin and mineral supplements as recommended by your health care provider.  Do not drink alcohol if:  Your health care provider tells you not to drink.  You are pregnant, may be pregnant, or are planning to become pregnant.  If you drink alcohol:  Limit how much you have to 0-1 drink a day.  Know how much alcohol is in your drink. In the [place] one drink equals one 12 oz bottle of beer (355 mL), one 5 oz glass of wine (148 mL), or one 1« oz glass of hard liquor (44 mL).  Lifestyle  Brush your teeth every morning and night with fluoride toothpaste. Floss one time each day.  Exercise for at least 30 minutes 5 or  more days each week.  Do not use any products that contain nicotine or tobacco. These products include cigarettes, chewing tobacco, and vaping devices, such as e-cigarettes. If you need help quitting, ask your health care provider.  Do not use drugs.  If you are sexually active, practice safe sex. Use a condom or other form of protection to prevent STIs.  If you do not wish to become pregnant, use a form of birth control. If you plan to become pregnant, see your health care provider for a prepregnancy visit.  Find healthy ways to manage stress, such as:  Meditation, yoga, or listening to music.  Journaling.  Talking to a trusted person.  Spending time with friends and family.  Minimize exposure to UV radiation to reduce your risk of skin cancer.  Safety  Always wear your seat belt while driving or riding in a vehicle.  Do not drive:  If you have been drinking alcohol. Do not ride with someone who has been drinking.  If you have been using any mind-altering substances or drugs.  While texting.  When you are tired or distracted.  Wear a helmet and other protective equipment during sports activities.  If you have firearms in your house, make sure you follow all gun safety procedures.  Seek help if you have been physically or sexually abused.  What's next?  Go to your health care provider once a year for an annual wellness visit.  Ask your health care provider how often you should have your eyes and teeth checked.  Stay up to date on all vaccines.  This information is not intended to replace advice given to you by your health care provider. Make sure you discuss any questions you have with your health care provider.  Document Revised: 06/15/2022 Document Reviewed: 06/15/2022  [employer] Patient Education c 2023 [employer]

## 2023-09-19 DIAGNOSIS — F41.9 ANXIETY: ICD-10-CM

## 2023-09-19 RX ORDER — BUSPIRONE HYDROCHLORIDE 10 MG/1
10 TABLET ORAL 3 TIMES DAILY PRN
Qty: 90 TABLET | Refills: 2 | OUTPATIENT
Start: 2023-09-19

## 2023-09-23 DIAGNOSIS — F41.8 MIXED ANXIETY AND DEPRESSIVE DISORDER: ICD-10-CM

## 2023-09-25 RX ORDER — VENLAFAXINE HYDROCHLORIDE 37.5 MG/1
CAPSULE, EXTENDED RELEASE ORAL
Qty: 30 CAPSULE | Refills: 1 | Status: SHIPPED | OUTPATIENT
Start: 2023-09-25

## 2023-10-28 ENCOUNTER — OFFICE VISIT (OUTPATIENT)
Age: 28
End: 2023-10-28
Payer: MEDICAID

## 2023-10-28 VITALS
OXYGEN SATURATION: 98 % | HEIGHT: 63 IN | SYSTOLIC BLOOD PRESSURE: 114 MMHG | TEMPERATURE: 97.1 F | RESPIRATION RATE: 18 BRPM | DIASTOLIC BLOOD PRESSURE: 80 MMHG | HEART RATE: 72 BPM | WEIGHT: 173 LBS | BODY MASS INDEX: 30.65 KG/M2

## 2023-10-28 DIAGNOSIS — R30.0 DYSURIA: ICD-10-CM

## 2023-10-28 DIAGNOSIS — R35.0 URINARY FREQUENCY: ICD-10-CM

## 2023-10-28 DIAGNOSIS — N30.01 ACUTE CYSTITIS WITH HEMATURIA: Primary | ICD-10-CM

## 2023-10-28 PROCEDURE — 99213 OFFICE O/P EST LOW 20 MIN: CPT | Performed by: NURSE PRACTITIONER

## 2023-10-28 RX ORDER — PHENAZOPYRIDINE HYDROCHLORIDE 100 MG/1
100 TABLET, FILM COATED ORAL 3 TIMES DAILY PRN
Qty: 9 TABLET | Refills: 0 | Status: SHIPPED | OUTPATIENT
Start: 2023-10-28

## 2023-10-28 RX ORDER — NORETHINDRONE 0.35 MG/1
TABLET ORAL
COMMUNITY
Start: 2023-10-09

## 2023-10-28 RX ORDER — LAMOTRIGINE 25 MG/1
50 TABLET ORAL NIGHTLY PRN
COMMUNITY
Start: 2023-09-05

## 2023-10-28 RX ORDER — VENLAFAXINE HYDROCHLORIDE 37.5 MG/1
CAPSULE, EXTENDED RELEASE ORAL DAILY
COMMUNITY
Start: 2023-10-14

## 2023-10-28 RX ORDER — NITROFURANTOIN 25; 75 MG/1; MG/1
100 CAPSULE ORAL 2 TIMES DAILY
Qty: 14 CAPSULE | Refills: 0 | Status: SHIPPED | OUTPATIENT
Start: 2023-10-28 | End: 2023-11-04

## 2023-10-28 RX ORDER — CARIPRAZINE 1.5 MG/1
1.5 CAPSULE, GELATIN COATED ORAL DAILY
COMMUNITY
Start: 2023-08-18

## 2023-10-28 ASSESSMENT — ENCOUNTER SYMPTOMS
ALLERGIC/IMMUNOLOGIC NEGATIVE: 1
EYES NEGATIVE: 1
RESPIRATORY NEGATIVE: 1
GASTROINTESTINAL NEGATIVE: 1

## 2023-10-28 NOTE — PROGRESS NOTES
Willie Dejesus (:  1995) is a 25 y.o. female,Established patient, here for evaluation of the following chief complaint(s):  Urinary Frequency (Unable to fully empty bladder, peeing very little frequently. ) and Urinary Pain    Patient presents today complaining of dysuria, urinary frequency and urgency, lower abdominal discomfort that began last weekend. Denies flank pain, body aches, chills, fever. States if she ever had urinary issues in the past she was able to take Azo and symptoms disappeared. She states she made an appointment on Tuesday with her OB/GYN which told her she had a small amount of bacteria in her urine. They told her to start Azo and it should clear up. They did not give her an antibiotic or send urine culture. Patient's symptoms continue to worsen at which time she called her OB/GYN office 2 days later and they could not get her in until Monday. She presents here today for treatment. As patient is taking Azo I was unable to run a urinalysis. I am treating for UTI based on her symptoms. She denies pregnancy at this time. Macrobid and Pyridium sent to pharmacy. Urine culture sent. Care instructions discussed. Patient verbalized understanding and agrees to plan of care. ASSESSMENT/PLAN:  1. Acute cystitis with hematuria  -     nitrofurantoin, macrocrystal-monohydrate, (MACROBID) 100 MG capsule; Take 1 capsule by mouth 2 times daily for 7 days, Disp-14 capsule, R-0Normal  2. Dysuria  -     Culture, Urine  -     phenazopyridine (PYRIDIUM) 100 MG tablet; Take 1 tablet by mouth 3 times daily as needed for Pain, Disp-9 tablet, R-0Normal  3.  Urinary frequency     Orders Placed This Encounter   Medications    nitrofurantoin, macrocrystal-monohydrate, (MACROBID) 100 MG capsule     Sig: Take 1 capsule by mouth 2 times daily for 7 days     Dispense:  14 capsule     Refill:  0    phenazopyridine (PYRIDIUM) 100 MG tablet     Sig: Take 1 tablet by mouth 3 times daily as needed for

## 2023-10-28 NOTE — PATIENT INSTRUCTIONS
Completely finish antibiotic. Antibiotics can lower the effectiveness of your birth control. Be sure to use another form of birth control for the next 2 weeks. May take Pyridium as needed for burning. You will receive a phone call regarding urine culture results. If a different treatment is needed you will be notified at that time. Increase water intake. Avoid caffeine, alcohol, and spicy foods as these are irritating to the bladder. Do not have intercourse until your symptoms are gone. Always urinate right after you have intercourse to flush out your bladder. After using the restroom be sure to wipe from front to back. If your symptoms do not improve within a couple of days, or if you develop fever or back pain follow-up with your primary care provider as this can be signs of a more severe infection.      For women who have frequent urinary tract infections or urinary symptoms I recommend:  -Start take D-Mannose with cranberry extract  -Start taking a probiotic for women's health

## 2023-10-29 LAB
BACTERIA UR CULT: ABNORMAL
BACTERIA UR CULT: ABNORMAL
ORGANISM: ABNORMAL

## 2023-10-30 LAB
BACTERIA UR CULT: ABNORMAL
BACTERIA UR CULT: ABNORMAL
ORGANISM: ABNORMAL

## 2024-01-17 ENCOUNTER — OFFICE VISIT (OUTPATIENT)
Age: 29
End: 2024-01-17

## 2024-01-17 VITALS
WEIGHT: 177.4 LBS | TEMPERATURE: 98.3 F | RESPIRATION RATE: 20 BRPM | DIASTOLIC BLOOD PRESSURE: 76 MMHG | BODY MASS INDEX: 31.43 KG/M2 | OXYGEN SATURATION: 98 % | HEART RATE: 85 BPM | SYSTOLIC BLOOD PRESSURE: 112 MMHG | HEIGHT: 63 IN

## 2024-01-17 DIAGNOSIS — R09.81 SINUS CONGESTION: ICD-10-CM

## 2024-01-17 DIAGNOSIS — J06.9 ACUTE UPPER RESPIRATORY INFECTION: Primary | ICD-10-CM

## 2024-01-17 DIAGNOSIS — R05.1 ACUTE COUGH: ICD-10-CM

## 2024-01-17 DIAGNOSIS — J02.9 SORE THROAT: ICD-10-CM

## 2024-01-17 LAB
INFLUENZA A ANTIBODY: NEGATIVE
INFLUENZA B ANTIBODY: NEGATIVE
S PYO AG THROAT QL: NORMAL
SARS-COV-2 N GENE RESP QL NAA+PROBE: NOT DETECTED

## 2024-01-17 RX ORDER — BROMPHENIRAMINE MALEATE, PSEUDOEPHEDRINE HYDROCHLORIDE, AND DEXTROMETHORPHAN HYDROBROMIDE 2; 30; 10 MG/5ML; MG/5ML; MG/5ML
5-10 SYRUP ORAL 4 TIMES DAILY PRN
Qty: 200 ML | Refills: 0 | Status: SHIPPED | OUTPATIENT
Start: 2024-01-17 | End: 2024-01-22

## 2024-01-17 RX ORDER — DEXAMETHASONE SODIUM PHOSPHATE 10 MG/ML
10 INJECTION INTRAMUSCULAR; INTRAVENOUS ONCE
Status: COMPLETED | OUTPATIENT
Start: 2024-01-17 | End: 2024-01-17

## 2024-01-17 RX ADMIN — DEXAMETHASONE SODIUM PHOSPHATE 10 MG: 10 INJECTION INTRAMUSCULAR; INTRAVENOUS at 13:06

## 2024-01-17 ASSESSMENT — ENCOUNTER SYMPTOMS
FACIAL SWELLING: 0
EYE REDNESS: 0
ABDOMINAL PAIN: 0
DIARRHEA: 0
WHEEZING: 0
APNEA: 0
EYE PAIN: 0
STRIDOR: 0
COLOR CHANGE: 0
CHOKING: 0
COUGH: 1
SHORTNESS OF BREATH: 0
EYE DISCHARGE: 0
NAUSEA: 0
CHEST TIGHTNESS: 0
TROUBLE SWALLOWING: 0
SORE THROAT: 1
CONSTIPATION: 0
VOMITING: 0
SINUS PRESSURE: 1
ABDOMINAL DISTENTION: 0
SINUS PAIN: 0

## 2024-01-17 NOTE — PROGRESS NOTES
[facility]  [facility]  [address] [place] [address] [address]IVE  [facility] [id number]  Dept: [phone]  Dept Fax: [phone]  Loc: [phone]    Consuelo Aparicio is a 28 y.o. female who presents today for her medical conditions/complaints as noted below.  Consuelo Aparicio is complaining of Nasal Congestion, Cough, Congestion, Pharyngitis, Headache, Chest Congestion (heavyness), Generalized Body Aches, and Chills        HPI:   Patient presents to the clinic today with complaints of sinus congestion, cough, sore throat, headaches, body aches, chills.  Symptoms started yesterday. No alleviating factors reported for this. Admits exposure to sick contacts. Symptoms are moderate. Patient is stable     Cough  Associated symptoms include chills, headaches, myalgias, postnasal drip and a sore throat. Pertinent negatives include no chest pain, ear pain, eye redness, fever, rash, shortness of breath or wheezing.   Pharyngitis  Associated symptoms include chills, congestion, coughing, fatigue, headaches, myalgias and a sore throat. Pertinent negatives include no abdominal pain, arthralgias, chest pain, diaphoresis, fever, joint swelling, nausea, numbness, rash or vomiting.   Headache  Chest Congestion  Associated symptoms include chills, congestion, coughing, fatigue, headaches, myalgias and a sore throat. Pertinent negatives include no abdominal pain, arthralgias, chest pain, diaphoresis, fever, joint swelling, nausea, numbness, rash or vomiting.   Generalized Body Aches  Associated symptoms include chills, congestion, coughing, fatigue, headaches, myalgias and a sore throat. Pertinent negatives include no abdominal pain, arthralgias, chest pain, diaphoresis, fever, joint swelling, nausea, numbness, rash or vomiting.       History reviewed. No pertinent past medical history.    History reviewed. No pertinent surgical history.    History reviewed. No pertinent family history.    Social History

## 2024-01-17 NOTE — PROGRESS NOTES
Medication was administered by Sole Perez MA at 1:07 PM.    Medication: dexamethasone  Amount: 10mg  Route: intramuscular  Site: Dorsogluteal right    Patient tolerated well.

## 2024-01-17 NOTE — PATIENT INSTRUCTIONS
Strep and flu tests were negative    COVID test sent to lab. We will call with results    Dexamethasone 10 mg injection given today in office    Bromfed as needed for cough    Etiology is likely viral and antibiotics are not currently indicated. Discussed this with patient in office.    Many illnesses are caused by viruses   - These conditions usually run their course in 7-14 days   - Antibiotics do not help fight viral infections and are not needed at this time   - Viral syndromes are treated with symptomatic support   - You may take tylenol or ibuprofen for fever or aches and pains.   - Stay hydrated by taking sips of water or non caffeinated, noncarbonated, and nonalcoholic beverages throughout the day   - For sore throat, you may gargle with warm salt water, use lozenges or sprays. Hot tea with honey may also be soothing to the throat.    - Using a daily antihistamine such as Claritin, Zyrtec or Allegra can help with upper respiratory symptoms. Benadryl can be sedating but is helpful at drying secretions and may be taken at night.    - For earaches, microwave a wet washcloth for 2 mins then using tongs (do not touch as it may scald you ) place cloth in ceramic cup and hold up to ear. The moist heat can be soothing to the ear.     If patient is not improving or developing any new/worsening symptoms then return to clinic or f/u with PCP    Any condition can change, despite proper treatment. Therefore, if symptoms still persist or worsen after treatment plan intitated today, either go to the nearest ER, or call PCP, or return to  for further evaluation.    Urgent Care evaluation today is not a substitute for PCP visit. Follow up care is your responsibility to discuss and review this UC visit.